# Patient Record
Sex: FEMALE | Race: OTHER | Employment: FULL TIME | URBAN - METROPOLITAN AREA
[De-identification: names, ages, dates, MRNs, and addresses within clinical notes are randomized per-mention and may not be internally consistent; named-entity substitution may affect disease eponyms.]

---

## 2018-05-22 ENCOUNTER — OFFICE VISIT (OUTPATIENT)
Dept: FAMILY MEDICINE CLINIC | Age: 18
End: 2018-05-22

## 2018-05-22 ENCOUNTER — TELEPHONE (OUTPATIENT)
Dept: FAMILY MEDICINE CLINIC | Age: 18
End: 2018-05-22

## 2018-05-22 VITALS
WEIGHT: 183 LBS | HEIGHT: 62 IN | SYSTOLIC BLOOD PRESSURE: 119 MMHG | OXYGEN SATURATION: 97 % | DIASTOLIC BLOOD PRESSURE: 77 MMHG | RESPIRATION RATE: 16 BRPM | TEMPERATURE: 98.1 F | HEART RATE: 78 BPM | BODY MASS INDEX: 33.68 KG/M2

## 2018-05-22 DIAGNOSIS — R10.11 ABDOMINAL PAIN, RIGHT UPPER QUADRANT: ICD-10-CM

## 2018-05-22 DIAGNOSIS — G89.29 CHRONIC PAIN OF BOTH KNEES: Primary | ICD-10-CM

## 2018-05-22 DIAGNOSIS — M25.561 CHRONIC PAIN OF BOTH KNEES: Primary | ICD-10-CM

## 2018-05-22 DIAGNOSIS — J45.20 MILD INTERMITTENT ASTHMA WITHOUT COMPLICATION: ICD-10-CM

## 2018-05-22 DIAGNOSIS — M25.562 CHRONIC PAIN OF BOTH KNEES: Primary | ICD-10-CM

## 2018-05-22 DIAGNOSIS — Z76.89 ENCOUNTER TO ESTABLISH CARE: ICD-10-CM

## 2018-05-22 DIAGNOSIS — N94.6 DYSMENORRHEA: ICD-10-CM

## 2018-05-22 RX ORDER — ALBUTEROL SULFATE 90 UG/1
2 AEROSOL, METERED RESPIRATORY (INHALATION)
Qty: 1 INHALER | Refills: 10 | Status: SHIPPED | OUTPATIENT
Start: 2018-05-22

## 2018-05-22 RX ORDER — ALBUTEROL SULFATE 90 UG/1
2 AEROSOL, METERED RESPIRATORY (INHALATION)
Qty: 1 INHALER | Refills: 10 | Status: SHIPPED | OUTPATIENT
Start: 2018-05-22 | End: 2018-05-22

## 2018-05-22 NOTE — TELEPHONE ENCOUNTER
/Refill  Received:  Today       [facility] Front Office                     [facility] [place] [facility] is requesting an authorization from [place] for Rx  Proventil, as the pt insurance company is declining due to a resident physician prescribing this Rx.  P [phone]

## 2018-05-22 NOTE — PROGRESS NOTES
Cody Waddell is a 19 y.o. female who presents to establish care. Pt moved to [place] about a month ago from [place]. She is currently training to be a [employer]. Pt would like to discuss the following;    1) Chronic Bilateral Knee pain: Patient requests referral to Ortho for bilateral knee pain. Reports history of right knee popping about 6 years ago while dancing and PCP said she torn her ligament. This was symptomatically managed. About 3-4 years ago, pt fell while skate boarding and states that her left knee \"popped\" but she did not seek care. Pt has since been having occasional swelling of both knees. Medication tried at home include Tylenol. She would also use her dad's knee brace as needed. Patient reinjured both knees on several occasions as she continued to [employer]. 2) Abdominal Pain: Reports progressive RUQ pain that wax and wanes. The pain began about a year ago. Pt describes it as dull and aching which is usually associated with \"eating too much. \"  Sometimes radiate to her back. The patient denies fever, nausea and vomiting. Patient denies heavy NSAIDs use. Pt has not tried anything for the pain. She drinks alcohol socially. Smokes about 2-4 sticks of cigarettes per day for about 11 years (started at age 3). 3) Dysmenorrhea: Pt has history of dysmenorrhea for which she was initially on OCP but would like to discuss different options as she sometimes forget to take her OCP. She states that she a one year old daughter and would like to focus on her career for now. LMP: Pt is currently on her period. Periods are Irregular with dysmenorrhea. Normal menstrual flow. 4) Asthma: Mild intermittent asthma. Well controlled. Usually triggered by exercised. Pt would like to be prescribed albuterol inhaler.      Allergies - reviewed:   No Known Allergies      Medications - reviewed:   Current Outpatient Prescriptions   Medication Sig    albuterol (PROVENTIL HFA, VENTOLIN HFA, PROAIR HFA) 90 mcg/actuation inhaler Take 2 Puffs by inhalation every four (4) hours as needed for Wheezing. No current facility-administered medications for this visit. Past Medical History - reviewed:  Past Medical History:   Diagnosis Date    Asthma        Past Surgical History - reviewed:   History reviewed. No pertinent surgical history. Social History - reviewed:  Social History     Social History    Marital status: SINGLE     Spouse name: N/A    Number of children: N/A    Years of education: N/A     Occupational History    Not on file. Social History Main Topics    Smoking status: Current Every Day Smoker    Smokeless tobacco: Never Used    Alcohol use No    Drug use: No    Sexual activity: Yes     Other Topics Concern    Not on file     Social History Narrative    No narrative on file         Family History - reviewed:  Family History   Problem Relation Age of Onset    Diabetes Maternal Grandmother          Immunizations - reviewed: There is no immunization history on file for this patient. Review of Systems:  A comprehensive review of systems was negative except for that written in the History of Present Illness.       Physical Exam  Visit Vitals    /77    Pulse 78    Temp 98.1 °F (36.7 °C) (Oral)    Resp 16    Ht 5' 2.21\" (1.58 m)    Wt 183 lb (83 kg)    LMP 05/20/2018    SpO2 97%    BMI 33.25 kg/m2       General appearance - alert, well appearing, and in no distress and oriented to person, place, and time  Eyes - pupils equal and reactive, extraocular eye movements intact  Chest - clear to auscultation, no wheezes, rales or rhonchi, symmetric air entry  Heart - normal rate, regular rhythm, normal S1, S2, no murmurs, rubs, clicks or gallops  Abdomen - soft, nontender, nondistended, no masses or organomegaly  Neurological - alert, oriented, normal speech, no focal findings or movement disorder noted    Musculoskeletal:  Knee: bilaterally  Knee Effusion: None  Quadriceps atrophy: None   Popliteal (Bakers) Cyst: Negative   Patellofemoral crepitus: Positive in the left knee    ROM:  Flexion: 130  Extension: 0   Hip IR/ER: FROM without pain    Dynamic Test:  Gait: Normal   Assistive devices: None    Palpation:   Patella tenderness: None  Medial joint line tenderness: Present  Lateral joint line tenderness: Present on left knee  MCL tenderness: Present  LCL tenderness: None    Ligament/Meniscal Exam:  Patellar Grind: Crepitus in both knees (L >R)  Lochman: Negative, with good endpoint   Anterior Drawer: Mild laxity   Posterior Drawer: Negative     Strength (0-5/5):   Flexion: Left: 5/5    Right: 5/5    Extension: Left: 5/5    Right: 5/5      Extremities - peripheral pulses normal, no pedal edema, no clubbing or cyanosis  Skin - normal coloration and turgor, no rashes, no suspicious skin lesions noted      Assessment/Plan    ICD-10-CM ICD-9-CM    1. Encounter to establish care Z76.89 V65.8    2. Chronic pain of both knees M25.561 719.46 REFERRAL TO PHYSICAL THERAPY    M25.562 338.29 XR KNEES BI STAND    G89.29  CANCELED: XR KNEE LT 3 V   3. Abdominal pain, right upper quadrant C09.86 021.96 METABOLIC PANEL, COMPREHENSIVE      US ABD COMP   4. Dysmenorrhea N94.6 625.3    5. Mild intermittent asthma without complication Z38.01 027.78 DISCONTINUED: albuterol (PROVENTIL HFA, VENTOLIN HFA, PROAIR HFA) 90 mcg/actuation inhaler      18 yo presenting to establish care. Patient had several concerns at this visit. 1) Chronic Bilateral Knee pain: Likely ACL and meniscus injury based on history. This is chronic.  -Bilateral knee xray  -Home Exercise Program as per handout.  -Ice 15 minutes, three times a day PRN and after exercise. Can alternate with heat for 15 minutes. -Naproxin or Acetaminophen (Tylenol) as needed for pain. -Referral to physical therapy    2) Abdominal Pain: RUQ pain, worst with food, wax and wanes.  Differential includes Cholelithiasis although it could be secondary to liver infection  -RUQ ultrasound  -CMP    3) Dysmenorrhea: Currently on her period. Tried OCP in the past but states that she would forget to get them on several days. Would like an Nexplanon.  -Directed to the health center as we do not administer it in clinic. 4) Asthma: Mild intermittent asthma. Well controlled.   -Albuterol prn    5) Allergies: Continue Flonase and Claritin     Patient will be contacted once above lab results and images are available. Follow-up Disposition:  Return if symptoms worsen or fail to improve. I have discussed the diagnosis with the patient and the intended plan as seen in the above orders. Patient verbalized understanding of the plan and agrees with the plan. The patient has received an after-visit summary and questions were answered concerning future plans. I have discussed medication side effects and warnings with the patient as well. Informed patient to return to the office if new symptoms arise.     Patient discussed with Dr. Yifan Corey (supervising physician)    Vel Kelly MD  Family Medicine Resident

## 2018-05-22 NOTE — PROGRESS NOTES
Chief Complaint   Patient presents with   Daren Cole Referral Request     Orthopedic   patient is requesting referral due to having pain in both right and left knee     1. Have you been to the ER, urgent care clinic since your last visit? Hospitalized since your last visit? No    2. Have you seen or consulted any other health care providers outside of the [facility] since your last visit? Include any pap smears or colon screening.  No

## 2018-05-22 NOTE — PATIENT INSTRUCTIONS
1. Home Exercise Program as per handout. 2. Ice 15 minutes, three times a day PRN and after exercise. Can alternate with heat for 15 minutes. Medications:    1. Naproxin (Aleve): 220mg 1-2 tablets twice a day PRN. 2. Acetaminophen (Tylenol):  500mg 1-2 tablets every 6 hours as needed for pain.

## 2018-05-22 NOTE — MR AVS SNAPSHOT
[address] [facility] 
[phone] Patient: Darion Avila MRN: [id number] Visit Information Date & Time Provider Department Dept. Phone Encounter #  
 2018  9:30 AM Vita Wright MD [facility] [facility] [phone] [id number] Follow-up Instructions Return if symptoms worsen or fail to improve. Upcoming Health Maintenance Date Due Hepatitis A Peds Age 1-18 (1 of 2 - Standard Series) 1999 DTaP/Tdap/Td series (1 - Tdap) 2005 HPV Age 9Y-34Y (1 of 3 - Female 3 Dose Series) 2009 Influenza Age 5 to Adult 2018 Allergies as of 2018  Review Complete On: 2018 By: Atul Stewart LPN No Known Allergies Current Immunizations  Never Reviewed No immunizations on file. Not reviewed this visit You Were Diagnosed With   
  
 Codes Comments Encounter to establish care    -  Primary ICD-10-CM: Z76.89 
ICD-9-CM: V65.8 Chronic pain of both knees     ICD-10-CM: M25.561, M25.562, G89.29 ICD-9-CM: 719.46, 338.29 Dysmenorrhea     ICD-10-CM: N94.6 ICD-9-CM: 625.3 Mild intermittent asthma without complication     NIP-84-MW: J45.20 ICD-9-CM: 493.90 Abdominal pain, right upper quadrant     ICD-10-CM: R10.11 ICD-9-CM: 789.01 Vitals BP Pulse Temp Resp Height(growth percentile) Weight(growth percentile) 119/77 78 98.1 °F (36.7 °C) (Oral) 16 5' 2.21\" (1.58 m) 183 lb (83 kg) LMP SpO2 BMI Smoking Status 2018 97% 33.25 kg/m2 Current Every Day Smoker Vitals History BMI and BSA Data Body Mass Index Body Surface Area  
 33.25 kg/m 2 1.91 m 2 Preferred Pharmacy Pharmacy Name Phone [facility] [address] AT [address] [place] [address] [phone] Your Updated Medication List  
  
   
 This list is accurate as of 5/22/18 10:41 AM.  Always use your most recent med list.  
  
  
  
  
 albuterol 90 mcg/actuation inhaler Commonly known as:  PROVENTIL HFA, VENTOLIN HFA, PROAIR HFA Take 2 Puffs by inhalation every four (4) hours as needed for Wheezing. Prescriptions Sent to Pharmacy Refills  
 albuterol (PROVENTIL HFA, VENTOLIN HFA, PROAIR HFA) 90 mcg/actuation inhaler 10 Sig: Take 2 Puffs by inhalation every four (4) hours as needed for Wheezing. Class: Normal  
 Pharmacy: [facility] [address] [place] [address] [id number] AT [facility] [address] ([facility] Ph #: [phone] Route: Inhalation We Performed the Following METABOLIC PANEL, COMPREHENSIVE [89085 CPT(R)] REFERRAL TO PHYSICAL THERAPY [MVD13 Custom] Comments:  
 Please evaluate patient for bilateral knee pain and treat. Please schedule and authorize patient for services : As permitted by patient's schedule Follow-up Instructions Return if symptoms worsen or fail to improve. To-Do List   
 05/22/2018 Imaging:  US ABD COMP   
  
 05/22/2018 Imaging:  XR KNEE LT 3 V Referral Information Referral ID Referred By Referred To  
  
 [id number] Mauro Mary Not Available Visits Status Start Date End Date 1 New Request 5/22/18 5/22/19 If your referral has a status of pending review or denied, additional information will be sent to support the outcome of this decision. Patient Instructions 1. Home Exercise Program as per handout. 2. Ice 15 minutes, three times a day PRN and after exercise. Can alternate with heat for 15 minutes. Medications:  
 1. Naproxin (Aleve): 220mg 1-2 tablets twice a day PRN. 2. Acetaminophen (Tylenol):  500mg 1-2 tablets every 6 hours as needed for pain. Introducing [facility]!    
 [facility] introduces [employer] patient portal. Now you can access parts of your medical record, email your doctor's office, and request medication refills online. 1. In your internet browser, go to [email or web address] 2. Click on the First Time User? Click Here link in the Sign In box. You will see the New Member Sign Up page. 3. Enter your [employer] Access Code exactly as it appears below. You will not need to use this code after youve completed the sign-up process. If you do not sign up before the expiration date, you must request a new code. · [employer] Access Code: [facility] Expires: 8/20/2018 10:38 AM 
 
4. Enter the last four digits of your Social Security Number (xxxx) and Date of Birth (mm/dd/yyyy) as indicated and click Submit. You will be taken to the next sign-up page. 5. Create a [employer] ID. This will be your [employer] login ID and cannot be changed, so think of one that is secure and easy to remember. 6. Create a [employer] password. You can change your password at any time. 7. Enter your Password Reset Question and Answer. This can be used at a later time if you forget your password. 8. Enter your e-mail address. You will receive e-mail notification when new information is available in [address] 9. Click Sign Up. You can now view and download portions of your medical record. 10. Click the Download Summary menu link to download a portable copy of your medical information. If you have questions, please visit the Frequently Asked Questions section of the [employer] website. Remember, [employer] is NOT to be used for urgent needs. For medical emergencies, dial 911. Now available from your iPhone and Android! Please provide this summary of care documentation to your next provider. If you have any questions after today's visit, please call [phone].

## 2018-05-23 LAB
ALBUMIN SERPL-MCNC: 4.3 G/DL (ref 3.5–5.5)
ALBUMIN/GLOB SERPL: 1.4 {RATIO} (ref 1.2–2.2)
ALP SERPL-CCNC: 78 IU/L (ref 39–117)
ALT SERPL-CCNC: 7 IU/L (ref 0–32)
AST SERPL-CCNC: 13 IU/L (ref 0–40)
BILIRUB SERPL-MCNC: <0.2 MG/DL (ref 0–1.2)
BUN SERPL-MCNC: 10 MG/DL (ref 6–20)
BUN/CREAT SERPL: 14 (ref 9–23)
CALCIUM SERPL-MCNC: 9.1 MG/DL (ref 8.7–10.2)
CHLORIDE SERPL-SCNC: 103 MMOL/L (ref 96–106)
CO2 SERPL-SCNC: 23 MMOL/L (ref 18–29)
CREAT SERPL-MCNC: 0.72 MG/DL (ref 0.57–1)
GFR SERPLBLD CREATININE-BSD FMLA CKD-EPI: 121 ML/MIN/1.73
GFR SERPLBLD CREATININE-BSD FMLA CKD-EPI: 139 ML/MIN/1.73
GLOBULIN SER CALC-MCNC: 3 G/DL (ref 1.5–4.5)
GLUCOSE SERPL-MCNC: 83 MG/DL (ref 65–99)
POTASSIUM SERPL-SCNC: 4.7 MMOL/L (ref 3.5–5.2)
PROT SERPL-MCNC: 7.3 G/DL (ref 6–8.5)
SODIUM SERPL-SCNC: 140 MMOL/L (ref 134–144)

## 2018-05-31 ENCOUNTER — HOSPITAL ENCOUNTER (OUTPATIENT)
Dept: ULTRASOUND IMAGING | Age: 18
Discharge: HOME | End: 2018-05-31
Attending: STUDENT IN AN ORGANIZED HEALTH CARE EDUCATION/TRAINING PROGRAM
Payer: MEDICAID

## 2018-05-31 DIAGNOSIS — R10.11 ABDOMINAL PAIN, RIGHT UPPER QUADRANT: ICD-10-CM

## 2018-05-31 PROCEDURE — 76700 US EXAM ABDOM COMPLETE: CPT

## 2018-06-09 ENCOUNTER — OFFICE VISIT (OUTPATIENT)
Dept: FAMILY MEDICINE CLINIC | Age: 18
End: 2018-06-09

## 2018-06-09 VITALS
BODY MASS INDEX: 33.2 KG/M2 | DIASTOLIC BLOOD PRESSURE: 76 MMHG | TEMPERATURE: 98.6 F | HEIGHT: 62 IN | SYSTOLIC BLOOD PRESSURE: 113 MMHG | OXYGEN SATURATION: 98 % | WEIGHT: 180.4 LBS | HEART RATE: 71 BPM

## 2018-06-09 DIAGNOSIS — J06.9 UPPER RESPIRATORY TRACT INFECTION, UNSPECIFIED TYPE: Primary | ICD-10-CM

## 2018-06-09 DIAGNOSIS — L29.9 EAR ITCH: ICD-10-CM

## 2018-06-09 DIAGNOSIS — F17.200 SMOKER: ICD-10-CM

## 2018-06-09 RX ORDER — AZITHROMYCIN 250 MG/1
TABLET, FILM COATED ORAL
Qty: 6 TAB | Refills: 0 | Status: SHIPPED | OUTPATIENT
Start: 2018-06-09 | End: 2018-06-14

## 2018-06-09 RX ORDER — HYDROCORTISONE AND ACETIC ACID 20.75; 10.375 MG/ML; MG/ML
2 SOLUTION AURICULAR (OTIC) 2 TIMES DAILY
Qty: 1 BOTTLE | Refills: 1 | Status: SHIPPED | OUTPATIENT
Start: 2018-06-09 | End: 2018-06-14

## 2018-06-09 NOTE — LETTER
6/9/2018 9:55 AM 
 
Ms. Paiz [address] [place] [address] Body mass index is 32.78 kg/(m^2). Focus on regular exercise (150 minutes each week) and healthy eating. Eat more fruits and vegetables. Eat more protein (egg whites, beans, and nuts you know you tolerate) and less carbohydrates (white bread, white rice, white pasta, white potatoes, sodas, and sweets). Eat appropriately small portion sizes. I strongly suggest that you avoid use of any tobacco products. This may be the most important thing you can do for your health. While medicines may help, the most important thing for you is the decision to quit. If you need a \"Quit \", please call 9-761-QUIT NOW (1-728.915.8363). If you need help with nicotine withdrawal, I suggest over-the-counter nicotine replacement aids such as nicotine gum or patches, used as directed. As you may know, use of tobacco products increases your risk for many forms of cancer, heart disease, stroke, and blood clotting. Your body is very forgiving. Quit now and improve your health! Use Vosol ear drops for itching. Sincerely, Tammy Michelle MD

## 2018-06-09 NOTE — PROGRESS NOTES
Chief Complaint   Patient presents with    Ear Pain     pt states history of ear infection, states currently a  taking med for swimmers ear with no improvement     1. Have you been to the ER, urgent care clinic since your last visit? Hospitalized since your last visit? No    2. Have you seen or consulted any other health care providers outside of the [facility] since your last visit? Include any pap smears or colon screening.  No'

## 2018-06-09 NOTE — PROGRESS NOTES
Luis A Rebollar is a 19 y.o. female      Issues discussed today include:        Signs and symptoms:  Ear pains  Duration:  5 days  Context:  She works as  at [employer]:  Both ears  Quality:  aches  Severity:  annoying  Timing:  Wax wane  Modifying factors:  Rx zmax and vosol HC    Data reviewed or ordered today:  See letter    Other problems include: There is no problem list on file for this patient. Medications:  Current Outpatient Prescriptions   Medication Sig Dispense Refill    carbamide peroxide (EAR DROPS OTC OT) by Otic route.  hydrocortisone-acetic acid (VOSOL-HC) otic solution Administer 2 Drops into each ear two (2) times a day for 5 days. 1 Bottle 1    azithromycin (ZITHROMAX) 250 mg tablet Take 2 tablets today, then take 1 tablet daily 6 Tab 0    albuterol (PROVENTIL HFA, VENTOLIN HFA, PROAIR HFA) 90 mcg/actuation inhaler Take 2 Puffs by inhalation every four (4) hours as needed for Wheezing. 1 Inhaler 10       Allergies:  No Known Allergies    LMP:  Patient's last menstrual period was 05/20/2018. Social History     Social History    Marital status: SINGLE     Spouse name: N/A    Number of children: N/A    Years of education: N/A     Occupational History    Not on file. Social History Main Topics    Smoking status: Current Every Day Smoker    Smokeless tobacco: Never Used    Alcohol use No    Drug use: No    Sexual activity: Yes     Other Topics Concern    Not on file     Social History Narrative         Family History   Problem Relation Age of Onset    Diabetes Maternal Grandmother          Meaningful use:  done      ROS:  Headaches:  no  Chest Pain:  no  SOB:  no  Fevers:  no  Falls:  no  anxiety/depression/losing interest in doing things that were previously enjoyed:  no. PHQ2 = 1  Other significant ROS:  +smoker    Patient's last menstrual period was 05/20/2018.     Physical Exam  Visit Vitals    /76 (BP 1 Location: Left arm, BP Patient Position: Sitting)    Pulse 71    Temp 98.6 °F (37 °C) (Oral)    Ht 5' 2.21\" (1.58 m)    Wt 180 lb 6.4 oz (81.8 kg)    LMP 05/20/2018    SpO2 98%    BMI 32.78 kg/m2     BP Readings from Last 3 Encounters:   06/09/18 113/76   05/22/18 119/77     Constitutional:  Appears well,  No Acute Distress, Vitals noted  Psychiatric:   Affect normal, Alert and cooperative, Oriented to person/place/time    Eyes:   Pupils equally round and reactive, EOMI, conjunctiva clear, eyelids normal  ENT:   External ears and nose normal/lips, teeth=OK/gums normal, TMs and Orophyarynx normal, ?otitis externa  Neck:   general inspection and Thyroid normal.  No abnormal cervical or supraclavicular nodes    Lungs:   clear to auscultation, good respiratory effort  Heart: Ausculation normal.  Regular rhythm. No cardiac murmurs. No carotid bruits or palpable thrills  Chest wall normal  Abdominal exam:   normal.  Liver and spleen normal.  No bruits/masses/tenderness    Extremities:   without edema, good peripheral pulses  Skin:   Warm to palpation, without rashes, bruising, or suspicious lesions           Assessment:    There is no problem list on file for this patient. Today's diagnoses are:    ICD-10-CM ICD-9-CM    1. Upper respiratory tract infection, unspecified type J06.9 465.9 azithromycin (ZITHROMAX) 250 mg tablet   2. Smoker F17.200 305.1     see letter   3. BMI 32.0-32.9,adult Z68.32 V85.32     see letter   4. Ear itch L29.9 698.9 hydrocortisone-acetic acid (VOSOL-HC) otic solution       Plan:  Orders Placed This Encounter    carbamide peroxide (EAR DROPS OTC OT)     Sig: by Otic route.  hydrocortisone-acetic acid (VOSOL-HC) otic solution     Sig: Administer 2 Drops into each ear two (2) times a day for 5 days.      Dispense:  1 Bottle     Refill:  1    azithromycin (ZITHROMAX) 250 mg tablet     Sig: Take 2 tablets today, then take 1 tablet daily     Dispense:  6 Tab     Refill:  0       See patient instructions  Patient Instructions   Take zithromax with food    See letter        refresh note:  done    AVS Printed:  done        Diagnoses and all orders for this visit:    1. Upper respiratory tract infection, unspecified type  -     azithromycin (ZITHROMAX) 250 mg tablet; Take 2 tablets today, then take 1 tablet daily    2. Smoker  Comments:  see letter    3. BMI 32.0-32.9,adult  Comments:  see letter    4. Ear itch  -     hydrocortisone-acetic acid (VOSOL-HC) otic solution; Administer 2 Drops into each ear two (2) times a day for 5 days.

## 2018-06-09 NOTE — MR AVS SNAPSHOT
[address] 
[phone] Patient: Haydee Zhang MRN: [id number] Visit Information Date & Time Provider Department Dept. Phone Encounter #  
 6/9/2018 10:50 AM Ulisses Conteh MD [address] [place] [address] [phone] [id number] Your Appointments 6/9/2018 10:50 AM  
WALK IN with Ulisses Conteh MD  
[address] [place], [place] [address]afshan Mayaas) Appt Note: ear infection [address] [facility],[place] 3 70 [facility]  
[phone]  
  
   
 [address] [place] [address] [phone] Upcoming Health Maintenance Date Due Hepatitis A Peds Age 1-18 (1 of 2 - Standard Series) 4/23/1999 DTaP/Tdap/Td series (1 - Tdap) 4/23/2005 HPV Age 9Y-34Y (1 of 3 - Female 3 Dose Series) 4/23/2009 Pneumococcal 19-64 Medium Risk (1 of 1 - PPSV23) 4/23/2017 Influenza Age 5 to Adult 8/1/2018 Allergies as of 6/9/2018  Review Complete On: 6/9/2018 By: Zackary Castellon LPN No Known Allergies Current Immunizations  Never Reviewed No immunizations on file. Not reviewed this visit You Were Diagnosed With   
  
 Codes Comments Upper respiratory tract infection, unspecified type    -  Primary ICD-10-CM: J06.9 ICD-9-CM: 465.9 Smoker     ICD-10-CM: L03.756 ICD-9-CM: 305.1 see letter BMI 32.0-32.9,adult     ICD-10-CM: D23.45 
ICD-9-CM: V85.32 see letter Ear itch     ICD-10-CM: L29.9 ICD-9-CM: 698.9 Vitals BP Pulse Temp Height(growth percentile) Weight(growth percentile) LMP  
 113/76 (BP 1 Location: Left arm, BP Patient Position: Sitting) 71 98.6 °F (37 °C) (Oral) 5' 2.21\" (1.58 m) 180 lb 6.4 oz (81.8 kg) 05/20/2018 SpO2 BMI Smoking Status 98% 32.78 kg/m2 Current Every Day Smoker Vitals History BMI and BSA Data Body Mass Index Body Surface Area 32.78 kg/m 2 1.89 m 2 Preferred Pharmacy Pharmacy Name Phone [address], [address] 1700 [facility] [address] [phone] Your Updated Medication List  
  
   
This list is accurate as of 6/9/18  9:58 AM.  Always use your most recent med list.  
  
  
  
  
 albuterol 90 mcg/actuation inhaler Commonly known as:  PROVENTIL HFA, VENTOLIN HFA, PROAIR HFA Take 2 Puffs by inhalation every four (4) hours as needed for Wheezing. azithromycin 250 mg tablet Commonly known as:  Odette Devantem Take 2 tablets today, then take 1 tablet daily EAR DROPS OTC OT  
by Otic route. hydrocortisone-acetic acid otic solution Commonly known as:  VOSOL-HC Administer 2 Drops into each ear two (2) times a day for 5 days. Prescriptions Printed Refills  
 hydrocortisone-acetic acid (VOSOL-HC) otic solution 1 Sig: Administer 2 Drops into each ear two (2) times a day for 5 days. Class: Print Route: Both Ears Prescriptions Sent to Pharmacy Refills  
 azithromycin (ZITHROMAX) 250 mg tablet 0 Sig: Take 2 tablets today, then take 1 tablet daily Class: Normal  
 Pharmacy: [address], [address] 1700 [facility] #: [phone] Patient Instructions Take zithromax with food See letter Introducing [facility]! Sharri Amezquita introduces [employer] patient portal. Now you can access parts of your medical record, email your doctor's office, and request medication refills online. 1. In your internet browser, go to [email or web address] 2. Click on the First Time User? Click Here link in the Sign In box. You will see the New Member Sign Up page. 3. Enter your [employer] Access Code exactly as it appears below. You will not need to use this code after youve completed the sign-up process. If you do not sign up before the expiration date, you must request a new code.  
 
· [employer] Access Code: [email or web address] 
 Expires: 8/22/2018 10:36 AM 
 
4. Enter the last four digits of your Social Security Number (xxxx) and Date of Birth (mm/dd/yyyy) as indicated and click Submit. You will be taken to the next sign-up page. 5. Create a Brazzlebox ID. This will be your Brazzlebox login ID and cannot be changed, so think of one that is secure and easy to remember. 6. Create a Brazzlebox password. You can change your password at any time. 7. Enter your Password Reset Question and Answer. This can be used at a later time if you forget your password. 8. Enter your e-mail address. You will receive e-mail notification when new information is available in [address] 9. Click Sign Up. You can now view and download portions of your medical record. 10. Click the Download Summary menu link to download a portable copy of your medical information. If you have questions, please visit the Frequently Asked Questions section of the Brazzlebox website. Remember, [employer]box is NOT to be used for urgent needs. For medical emergencies, dial 911. Now available from your iPhone and Android! Please provide this summary of care documentation to your next provider. Your primary care clinician is listed as Maryanne Wheeler. If you have any questions after today's visit, please call [phone].

## 2018-06-09 NOTE — LETTER
6/9/2018 10:02 AM 
 
Ms. [id number] [place] [phone] Consider WELLNESS exam and vaccine updates soon. Sincerely, Darryl Monroe MD

## 2018-07-07 ENCOUNTER — OFFICE VISIT (OUTPATIENT)
Dept: FAMILY MEDICINE CLINIC | Age: 18
End: 2018-07-07

## 2018-07-07 VITALS
SYSTOLIC BLOOD PRESSURE: 107 MMHG | WEIGHT: 179 LBS | OXYGEN SATURATION: 100 % | RESPIRATION RATE: 18 BRPM | HEART RATE: 78 BPM | BODY MASS INDEX: 32.52 KG/M2 | DIASTOLIC BLOOD PRESSURE: 73 MMHG | TEMPERATURE: 97.7 F

## 2018-07-07 DIAGNOSIS — F17.200 SMOKER: ICD-10-CM

## 2018-07-07 DIAGNOSIS — M76.51 PATELLAR TENDONITIS OF BOTH KNEES: Primary | ICD-10-CM

## 2018-07-07 DIAGNOSIS — M25.562 CHRONIC PAIN OF BOTH KNEES: ICD-10-CM

## 2018-07-07 DIAGNOSIS — G89.29 CHRONIC PAIN OF BOTH KNEES: ICD-10-CM

## 2018-07-07 DIAGNOSIS — M76.52 PATELLAR TENDONITIS OF BOTH KNEES: Primary | ICD-10-CM

## 2018-07-07 DIAGNOSIS — M25.561 CHRONIC PAIN OF BOTH KNEES: ICD-10-CM

## 2018-07-07 RX ORDER — MELOXICAM 7.5 MG/1
7.5 TABLET ORAL DAILY
Qty: 10 TAB | Refills: 0 | Status: SHIPPED | OUTPATIENT
Start: 2018-07-07 | End: 2018-07-22 | Stop reason: SDUPTHER

## 2018-07-07 RX ORDER — LEVONORGESTREL AND ETHINYL ESTRADIOL 0.1-0.02MG
KIT ORAL
COMMUNITY
Start: 2018-06-18

## 2018-07-07 NOTE — PATIENT INSTRUCTIONS
Continue physical therapy    Take meloxicam with food    Stay active    See Dr. Kari Ackerman #[phone]

## 2018-07-07 NOTE — MR AVS SNAPSHOT
[address] 
[phone] Patient: Estephania Sifuentes MRN: [id number] Visit Information Date & Time Provider Department Dept. Phone Encounter #  
 7/7/2018  8:00 AM Melody Gillis MD [facility] [phone] [id number] Upcoming Health Maintenance Date Due Hepatitis A Peds Age 1-18 (1 of 2 - Standard Series) 4/23/1999 DTaP/Tdap/Td series (1 - Tdap) 4/23/2005 HPV Age 9Y-34Y (1 of 3 - Female 3 Dose Series) 4/23/2009 Pneumococcal 19-64 Medium Risk (1 of 1 - PPSV23) 4/23/2017 Influenza Age 5 to Adult 8/1/2018 Allergies as of 7/7/2018  Review Complete On: 7/7/2018 By: Melody Gillis MD  
 No Known Allergies Current Immunizations  Never Reviewed No immunizations on file. Not reviewed this visit You Were Diagnosed With   
  
 Codes Comments Patellar tendonitis of both knees    -  Primary ICD-10-CM: M76.51, M76.52 
ICD-9-CM: 726.64 Chronic pain of both knees     ICD-10-CM: M25.561, M25.562, G89.29 ICD-9-CM: 719.46, 338.29 Vitals BP Pulse Temp Resp Weight(growth percentile) LMP  
 107/73 (BP 1 Location: Right arm, BP Patient Position: Sitting) 78 97.7 °F (36.5 °C) (Oral) 18 179 lb (81.2 kg) 06/20/2018 SpO2 BMI Smoking Status 100% 32.52 kg/m2 Current Every Day Smoker BMI and BSA Data Body Mass Index Body Surface Area 32.52 kg/m 2 1.89 m 2 Preferred Pharmacy Pharmacy Name Phone [address], [address] [place] [address] [address]. [address] [facility] [phone] Your Updated Medication List  
  
   
This list is accurate as of 7/7/18  8:25 AM.  Always use your most recent med list.  
  
  
  
  
 albuterol 90 mcg/actuation inhaler Commonly known as:  PROVENTIL HFA, VENTOLIN HFA, PROAIR HFA Take 2 Puffs by inhalation every four (4) hours as needed for Wheezing. AVIANE 0.1-20 mg-mcg Tab Generic drug:  levonorgestrel-ethinyl estradiol EAR DROPS OTC OT  
by Otic route. We Performed the Following REFERRAL TO ORTHOPEDIC SURGERY [REF62 Custom] Comments:  
 Dr. Roseann Max #[phone] Referral Information Referral ID Referred By Referred To  
  
 [id number] Moisés Hawk Not Available Visits Status Start Date End Date 1 New Request 7/7/18 7/7/19 If your referral has a status of pending review or denied, additional information will be sent to support the outcome of this decision. Patient Instructions Continue physical therapy Take meloxicam with food Stay active See Dr. Roseann Max #[phone] Introducing [facility] & [facility]! Caryn Otero introduces [employer] patient portal. Now you can access parts of your medical record, email your doctor's office, and request medication refills online. 1. In your internet browser, go to [email or web address] 2. Click on the First Time User? Click Here link in the Sign In box. You will see the New Member Sign Up page. 3. Enter your [employer] Access Code exactly as it appears below. You will not need to use this code after youve completed the sign-up process. If you do not sign up before the expiration date, you must request a new code. · [employer] Access Code: [email or web address] Expires: 8/22/2018 10:36 AM 
 
4. Enter the last four digits of your Social Security Number (xxxx) and Date of Birth (mm/dd/yyyy) as indicated and click Submit. You will be taken to the next sign-up page. 5. Create a Zillowt ID. This will be your [employer] login ID and cannot be changed, so think of one that is secure and easy to remember. 6. Create a [employer] password. You can change your password at any time. 7. Enter your Password Reset Question and Answer. This can be used at a later time if you forget your password. 8. Enter your e-mail address.  You will receive e-mail notification when new information is available in [employer]. 9. Click Sign Up. You can now view and download portions of your medical record. 10. Click the Download Summary menu link to download a portable copy of your medical information. If you have questions, please visit the Frequently Asked Questions section of the [employer] website. Remember, [employer] is NOT to be used for urgent needs. For medical emergencies, dial 911. Now available from your iPhone and Android! Please provide this summary of care documentation to your next provider. Your primary care clinician is listed as Davin Garcia. If you have any questions after today's visit, please call [phone].

## 2018-07-07 NOTE — PROGRESS NOTES
Miko Jay is a 19 y.o. female      Issues discussed today include:        Signs and symptoms:  Bilateral knee pain L>R  Duration:  weeks  Context:  She is getting PT but not helping  Location:  Left patellar area  Quality:  Sounds like patellar tendonitis  Severity:  Impacting activity  Timing:  She had normal XR both knees 5/2018  Modifying factors:  Rx mobic and refer ortho    Data reviewed or ordered today:  XR 5/2018    Other problems include:  Patient Active Problem List   Diagnosis Code    Smoker F17.200    Patellar tendonitis of both knees M76.51, M76.52       Medications:  Current Outpatient Prescriptions   Medication Sig Dispense Refill    meloxicam (MOBIC) 7.5 mg tablet Take 1 Tab by mouth daily. 10 Tab 0    AVIANE 0.1-20 mg-mcg tab       carbamide peroxide (EAR DROPS OTC OT) by Otic route.  albuterol (PROVENTIL HFA, VENTOLIN HFA, PROAIR HFA) 90 mcg/actuation inhaler Take 2 Puffs by inhalation every four (4) hours as needed for Wheezing. 1 Inhaler 10       Allergies:  No Known Allergies    LMP:  Patient's last menstrual period was 06/20/2018. Social History     Social History    Marital status: SINGLE     Spouse name: N/A    Number of children: N/A    Years of education: N/A     Occupational History    Not on file. Social History Main Topics    Smoking status: Current Every Day Smoker    Smokeless tobacco: Never Used    Alcohol use No    Drug use: No    Sexual activity: Yes     Other Topics Concern    Not on file     Social History Narrative         Family History   Problem Relation Age of Onset    Diabetes Maternal Grandmother          Meaningful use:  done      ROS:  Headaches:  no  Chest Pain:  no  SOB:  no  Fevers:  no  Falls:  no  Other significant ROS:      Patient's last menstrual period was 06/20/2018.     Physical Exam  Visit Vitals    /73 (BP 1 Location: Right arm, BP Patient Position: Sitting)    Pulse 78    Temp 97.7 °F (36.5 °C) (Oral)    Resp 18    Wt 179 lb (81.2 kg)    LMP 06/20/2018    SpO2 100%    BMI 32.52 kg/m2     BP Readings from Last 3 Encounters:   07/07/18 107/73   06/09/18 113/76   05/22/18 119/77     Constitutional:  Appears well,  No Acute Distress, Vitals noted  Psychiatric:   Affect normal, Alert and cooperative, Oriented to person/place/time    MSK:  Full ROM both knees. No joint laxity. Patella and ankle reflexes normal.  CS/LS seems ok. Some tenderness of SI joints. Assessment:    Patient Active Problem List   Diagnosis Code    Smoker F17.200    Patellar tendonitis of both knees M76.51, M76.52       Today's diagnoses are:    ICD-10-CM ICD-9-CM    1. Patellar tendonitis of both knees M76.51 726.64 REFERRAL TO ORTHOPEDIC SURGERY    M76.52  meloxicam (MOBIC) 7.5 mg tablet   2. Chronic pain of both knees M25.561 719.46 REFERRAL TO ORTHOPEDIC SURGERY    M25.562 338.29 meloxicam (MOBIC) 7.5 mg tablet    G89.29     3. Smoker F17.200 305.1     advised to avoid tobacco products 7/7/2018       Plan:  Orders Placed This Encounter    REFERRAL TO ORTHOPEDIC SURGERY     Referral Priority:   Routine     Referral Type:   Consultation     Referral Reason:   Specialty Services Required     Requested Specialty:   Orthopedic Surgery    AVIANE 0.1-20 mg-mcg tab    meloxicam (MOBIC) 7.5 mg tablet     Sig: Take 1 Tab by mouth daily.      Dispense:  10 Tab     Refill:  0     Take with food       See patient instructions  Patient Instructions   Continue physical therapy    Take meloxicam with food    Stay active    See Dr. Roseann Max #[phone]        refresh note:  done    AVS Printed:  done      advised to avoid tobacco products 7/7/2018

## 2018-07-22 DIAGNOSIS — M25.562 CHRONIC PAIN OF BOTH KNEES: ICD-10-CM

## 2018-07-22 DIAGNOSIS — M76.52 PATELLAR TENDONITIS OF BOTH KNEES: ICD-10-CM

## 2018-07-22 DIAGNOSIS — M25.561 CHRONIC PAIN OF BOTH KNEES: ICD-10-CM

## 2018-07-22 DIAGNOSIS — G89.29 CHRONIC PAIN OF BOTH KNEES: ICD-10-CM

## 2018-07-22 DIAGNOSIS — M76.51 PATELLAR TENDONITIS OF BOTH KNEES: ICD-10-CM

## 2018-07-23 RX ORDER — MELOXICAM 7.5 MG/1
TABLET ORAL
Qty: 10 TAB | Refills: 0 | Status: SHIPPED | OUTPATIENT
Start: 2018-07-23 | End: 2018-08-07 | Stop reason: SDUPTHER

## 2018-08-03 ENCOUNTER — OFFICE VISIT (OUTPATIENT)
Dept: FAMILY MEDICINE CLINIC | Age: 18
End: 2018-08-03

## 2018-08-03 VITALS
OXYGEN SATURATION: 99 % | WEIGHT: 179 LBS | TEMPERATURE: 97.2 F | HEIGHT: 62 IN | RESPIRATION RATE: 16 BRPM | HEART RATE: 87 BPM | DIASTOLIC BLOOD PRESSURE: 83 MMHG | SYSTOLIC BLOOD PRESSURE: 118 MMHG | BODY MASS INDEX: 32.94 KG/M2

## 2018-08-03 DIAGNOSIS — H93.13 TINNITUS OF BOTH EARS: Primary | ICD-10-CM

## 2018-08-03 DIAGNOSIS — F41.9 ANXIETY AND DEPRESSION: ICD-10-CM

## 2018-08-03 DIAGNOSIS — F32.A ANXIETY AND DEPRESSION: ICD-10-CM

## 2018-08-03 RX ORDER — PAROXETINE 10 MG/1
10 TABLET, FILM COATED ORAL DAILY
Qty: 30 TAB | Refills: 1 | Status: SHIPPED | OUTPATIENT
Start: 2018-08-03

## 2018-08-03 NOTE — MR AVS SNAPSHOT
[address] 
[phone] Patient: Darlene Bowen MRN: [id number] Visit Information Date & Time Provider Department Dept. Phone Encounter #  
 8/3/2018  9:45 AM Olivia Hill MD 9[address] [facility] [phone] [id number] Follow-up Instructions Return for 2 week med check . Upcoming Health Maintenance Date Due Hepatitis A Peds Age 1-18 (1 of 2 - Standard Series) 4/23/1999 DTaP/Tdap/Td series (1 - Tdap) 4/23/2005 HPV Age 9Y-34Y (1 of 3 - Female 3 Dose Series) 4/23/2009 Pneumococcal 19-64 Medium Risk (1 of 1 - PPSV23) 4/23/2017 Influenza Age 5 to Adult 8/1/2018 Allergies as of 8/3/2018  Review Complete On: 8/3/2018 By: Olivia Hill MD  
 No Known Allergies Current Immunizations  Never Reviewed No immunizations on file. Not reviewed this visit You Were Diagnosed With   
  
 Codes Comments Tinnitus of both ears    -  Primary ICD-10-CM: H93.13 
ICD-9-CM: 388.30 MEÑO (generalized anxiety disorder)     ICD-10-CM: F41.1 ICD-9-CM: 300.02 Vitals BP Pulse Temp Resp Height(growth percentile) Weight(growth percentile) 118/83 87 97.2 °F (36.2 °C) (Oral) 16 5' 2.21\" (1.58 m) 179 lb (81.2 kg) LMP SpO2 BMI Smoking Status 08/03/2018 99% 32.52 kg/m2 Current Every Day Smoker Vitals History BMI and BSA Data Body Mass Index Body Surface Area 32.52 kg/m 2 1.89 m 2 Preferred Pharmacy Pharmacy Name Phone [address], [facility] [facility] [phone] Your Updated Medication List  
  
   
This list is accurate as of 8/3/18 10:23 AM.  Always use your most recent med list.  
  
  
  
  
 albuterol 90 mcg/actuation inhaler Commonly known as:  PROVENTIL HFA, VENTOLIN HFA, PROAIR HFA Take 2 Puffs by inhalation every four (4) hours as needed for Wheezing. AVIANE 0.1-20 mg-mcg Tab Generic drug:  levonorgestrel-ethinyl estradiol EAR DROPS OTC OT  
by Otic route. meloxicam 7.5 mg tablet Commonly known as:  MOBIC  
TAKE 1 TABLET BY MOUTH ONCE DAILY PARoxetine 10 mg tablet Commonly known as:  PAXIL Take 1 Tab by mouth daily. Prescriptions Sent to Pharmacy Refills PARoxetine (PAXIL) 10 mg tablet 1 Sig: Take 1 Tab by mouth daily. Class: Normal  
 Pharmacy: [address] [facility] [address], [address] 1700 [facility] #: [phone] Route: Oral  
  
We Performed the Following REFERRAL TO ENT-OTOLARYNGOLOGY [PSA24 Custom] Follow-up Instructions Return for 2 week med check . Referral Information Referral ID Referred By Referred To  
  
 [id number] [address] [facility] [address] Visits Status Start Date End Date 1 New Request 8/3/18 8/3/19 If your referral has a status of pending review or denied, additional information will be sent to support the outcome of this decision. Patient Instructions Anxiety Disorder: Care Instructions Your Care Instructions Anxiety is a normal reaction to stress. Difficult situations can cause you to have symptoms such as sweaty palms and a nervous feeling. In an anxiety disorder, the symptoms are far more severe. Constant worry, muscle tension, trouble sleeping, nausea and diarrhea, and other symptoms can make normal daily activities difficult or impossible. These symptoms may occur for no reason, and they can affect your work, school, or social life. Medicines, counseling, and self-care can all help. Follow-up care is a key part of your treatment and safety. Be sure to make and go to all appointments, and call your doctor if you are having problems. It's also a good idea to know your test results and keep a list of the medicines you take. How can you care for yourself at home? · Take medicines exactly as directed. Call your doctor if you think you are having a problem with your medicine. · Go to your counseling sessions and follow-up appointments. · Recognize and accept your anxiety. Then, when you are in a situation that makes you anxious, say to yourself, \"This is not an emergency. I feel uncomfortable, but I am not in danger. I can keep going even if I feel anxious. \" · Be kind to your body: ¨ Relieve tension with exercise or a massage. ¨ Get enough rest. 
¨ Avoid alcohol, caffeine, nicotine, and illegal drugs. They can increase your anxiety level and cause sleep problems. ¨ Learn and do relaxation techniques. See below for more about these techniques. · Engage your mind. Get out and do something you enjoy. Go to a funny movie, or take a walk or hike. Plan your day. Having too much or too little to do can make you anxious. · Keep a record of your symptoms. Discuss your fears with a good friend or family member, or join a support group for people with similar problems. Talking to others sometimes relieves stress. · Get involved in social groups, or volunteer to help others. Being alone sometimes makes things seem worse than they are. · Get at least 30 minutes of exercise on most days of the week to relieve stress. Walking is a good choice. You also may want to do other activities, such as running, swimming, cycling, or playing tennis or team sports. Relaxation techniques Do relaxation exercises 10 to 20 minutes a day. You can play soothing, relaxing music while you do them, if you wish. · Tell others in your house that you are going to do your relaxation exercises. Ask them not to disturb you. · Find a comfortable place, away from all distractions and noise. · Lie down on your back, or sit with your back straight. · Focus on your breathing. Make it slow and steady. · Breathe in through your nose. Breathe out through either your nose or mouth. · Breathe deeply, filling up the area between your navel and your rib cage. Breathe so that your belly goes up and down. · Do not hold your breath. · Breathe like this for 5 to 10 minutes. Notice the feeling of calmness throughout your whole body. As you continue to breathe slowly and deeply, relax by doing the following for another 5 to 10 minutes: · Tighten and relax each muscle group in your body. You can begin at your toes and work your way up to your head. · Imagine your muscle groups relaxing and becoming heavy. · Empty your mind of all thoughts. · Let yourself relax more and more deeply. · Become aware of the state of calmness that surrounds you. · When your relaxation time is over, you can bring yourself back to alertness by moving your fingers and toes and then your hands and feet and then stretching and moving your entire body. Sometimes people fall asleep during relaxation, but they usually wake up shortly afterward. · Always give yourself time to return to full alertness before you drive a car or do anything that might cause an accident if you are not fully alert. Never play a relaxation tape while you drive a car. When should you call for help? Call 911 anytime you think you may need emergency care. For example, call if: 
  · You feel you cannot stop from hurting yourself or someone else.  
Edgar Jay the numbers for these national suicide hotlines: 6-184-787-TALK (3-415.418.2816) and 2-999-KUDFOOF (8-774.429.7207). If you or someone you know talks about suicide or feeling hopeless, get help right away. 
 Watch closely for changes in your health, and be sure to contact your doctor if: 
  · You have anxiety or fear that affects your life.  
  · You have symptoms of anxiety that are new or different from those you had before. Where can you learn more? Go to http://anneliese-isaura.info/. Enter P754 in the search box to learn more about \"Anxiety Disorder: Care Instructions. \" Current as of: December 7, 2017 Content Version: 11.7 © 3583-2567 [employer]. Care instructions adapted under license by [employer] (which disclaims liability or warranty for this information). If you have questions about a medical condition or this instruction, always ask your healthcare professional. [address] any warranty or liability for your use of this information. Paroxetine (By mouth) Paroxetine ([id number]) Treats depression, anxiety disorders, obsessive-compulsive disorder (OCD), and premenstrual dysphoric disorder (PMDD). Brisdelle treats hot flashes caused by menopause. This medicine is an SSRI. Brand Name(s): Brisdelle, Paxil, Paxil CR, Corwin Boudreaux There may be other brand names for this medicine. When This Medicine Should Not Be Used: This medicine is not right for everyone. Do not use it if you had an allergic reaction to paroxetine, or if you are pregnant. How to Use This Medicine:  
Capsule, Liquid, Tablet, Long Acting Tablet · Take your medicine as directed. Your dose may need to be changed several times to find what works best for you. · Oral liquid, Tablet, Extended-release Tablet: These are usually taken in the morning. · Brisdelle capsule: Take at bedtime. · Oral liquid: Measure the oral liquid medicine with a marked measuring spoon, oral syringe, or medicine cup. Shake the bottle well just before you measure each dose. · Tablet or Extended-release Tablet: Swallow whole. Do not crush, break, or chew it. Do not use an extended-release tablet that is cracked or chipped. · You may need to take this medicine for a month or longer before you feel better. If you feel that the medicine is not working well, do not take more than your normal dose. Call your doctor for instructions. · This medicine should come with a Medication Guide. Ask your pharmacist for a copy if you do not have one. · Missed dose: Take a dose as soon as you remember. If it is almost time for your next dose, wait until then and take a regular dose. Do not take extra medicine to make up for a missed dose. · Store the medicine in a closed container at room temperature, away from heat, moisture, and direct light. Drugs and Foods to Avoid: Ask your doctor or pharmacist before using any other medicine, including over-the-counter medicines, vitamins, and herbal products. · Do not use paroxetine and an MAO inhibitor within 14 days of each other. Do not use this medicine if you are using pimozide or thioridazine. · Some medicines can affect how paroxetine works. Tell your doctor if you are using any of the following: 
¨ Buspirone, cimetidine, digoxin, fentanyl, fosamprenavir/ritonavir, lithium, procyclidine, risperidone, Tiffany's wort, tamoxifen, theophylline, tramadol, or tryptophan supplements ¨ Amphetamines ¨ Blood thinner (including warfarin) ¨ Diuretic (water pill) ¨ Medicine for heart rhythm problems ¨ NSAID pain or arthritis medicine (including aspirin, celecoxib, diclofenac, ibuprofen, naproxen) ¨ Other medicine for depression or anxiety ¨ Phenothiazine medicine (including chlorpromazine, perphenazine, prochlorperazine, promethazine) ¨ Triptan medicine for migraine headaches · Do not drink alcohol while you are using this medicine. Warnings While Using This Medicine: · It is not safe to take this medicine during pregnancy. It could harm an unborn baby. Tell your doctor right away if you become pregnant. · Tell your doctor if you are breastfeeding, or if you have kidney disease, liver disease, glaucoma, or a history of epilepsy or seizures. · For some children, teenagers, and young adults, this medicine may increase mental or emotional problems.  This may lead to thoughts of suicide and violence. Talk with your doctor right away if you have any thoughts or behavior changes that concern you. Tell your doctor if you or anyone in your family has a history of bipolar disorder or suicide attempts. · This medicine may cause the following problems: 
¨ Serotonin syndrome (may be life-threatening when used with certain other medicines) ¨ Low sodium levels in the blood ¨ Higher risk of bleeding problems ¨ Higher risk of broken bones · Do not stop using this medicine suddenly. Your doctor will need to slowly decrease your dose before you stop it completely. · This medicine may make you dizzy or drowsy. Do not drive or do anything that could be dangerous until you know how this medicine affects you. · Keep all medicine out of the reach of children. Never share your medicine with anyone. Possible Side Effects While Using This Medicine:  
Call your doctor right away if you notice any of these side effects: · Allergic reaction: Itching or hives, swelling in your face or hands, swelling or tingling in your mouth or throat, chest tightness, trouble breathing · Anxiety, restlessness, fast heartbeat, fever, sweating, muscle spasms, nausea, vomiting, diarrhea, seeing or hearing things that are not there · Bone pain, tenderness, swelling, or bruising · Changes in behavior, thoughts of hurting yourself or others · Confusion, weakness, and muscle twitching · Eye pain, vision changes, seeing halos around lights · Trouble keeping still, feeling restless and agitated, racing thoughts, excessive energy, trouble sleeping · Unusual bleeding or bruising If you notice these less serious side effects, talk with your doctor: · Blurred vision, dizziness, drowsiness, or sleepiness · Constipation, upset stomach, loss of appetite, weight loss · Dry mouth · Sexual problems If you notice other side effects that you think are caused by this medicine, tell your doctor. Call your doctor for medical advice about side effects. You may report side effects to FDA at 1-384-FDA-6585 © 2017 [address] Information is for End User's use only and may not be sold, redistributed or otherwise used for commercial purposes. The above information is an  only. It is not intended as medical advice for individual conditions or treatments. Talk to your doctor, nurse or pharmacist before following any medical regimen to see if it is safe and effective for you. Introducing [facility] SERVICES! Kimberly De La Fuente introduces [employer] patient portal. Now you can access parts of your medical record, email your doctor's office, and request medication refills online. 1. In your internet browser, go to https://[employer]. [employer] 2. Click on the First Time User? Click Here link in the Sign In box. You will see the New Member Sign Up page. 3. Enter your [employer] Access Code exactly as it appears below. You will not need to use this code after youve completed the sign-up process. If you do not sign up before the expiration date, you must request a new code. · [employer] Access Code: [email or web address] Expires: 8/22/2018 10:36 AM 
 
4. Enter the last four digits of your Social Security Number (xxxx) and Date of Birth (mm/dd/yyyy) as indicated and click Submit. You will be taken to the next sign-up page. 5. Create a [employer] ID. This will be your [employer] login ID and cannot be changed, so think of one that is secure and easy to remember. 6. Create a [employer] password. You can change your password at any time. 7. Enter your Password Reset Question and Answer. This can be used at a later time if you forget your password. 8. Enter your e-mail address. You will receive e-mail notification when new information is available in [address] 9. Click Sign Up. You can now view and download portions of your medical record. 10. Click the Download Summary menu link to download a portable copy of your medical information. If you have questions, please visit the Frequently Asked Questions section of the [employer] website. Remember, [employer] is NOT to be used for urgent needs. For medical emergencies, dial 911. Now available from your iPhone and Android! Please provide this summary of care documentation to your next provider. Your primary care clinician is listed as Luz Marina Cantu. If you have any questions after today's visit, please call [phone].

## 2018-08-03 NOTE — PROGRESS NOTES
Tanya Fontaine [address]ina [address] Office [phone], Fax [phone] Subjective: Chief Complaint Patient presents with  Anxiety  
  needs anxiety medicine until psychiatry appointment  Hearing Problem History provided by patient HPI: 
Ludy Mancilla is a 19 y.o. AA female presents hearing problem and anxiety/ADHD. Significant history includes hx of swimmer's ear. Hearing deterioration 
- Started 2-3 years, feels like it is getting worse now (2-3x a day -> now throughout the day) - Trying to not wear ear buds - Treated for swimmers ear on 6/9 with zithromax and ear drops - cleared now 
- ROS: has ringing in her ears b/l and at times decreased sounds (happens with her panic attacks too), no room spinning, no ear ache. +HA (tension headache 2/wk) Anxiety and ADHD 
- had evaluation at [facility] psychiatry for anxiety and ADHD on June 20 - Appointment coming up on sept 4th. Was told to get her meds from her PCP w/o any records 
- ROS: anxious about everything (3 panic attacks in the last 3 months), says getting out of hand, PhQ9 =16, GAD7 =18 Medication reviewed. Allergy reviewed. Past Medical History:  
Diagnosis Date  Asthma Current Outpatient Prescriptions on File Prior to Visit Medication Sig Dispense Refill  AVIANE 0.1-20 mg-mcg tab  albuterol (PROVENTIL HFA, VENTOLIN HFA, PROAIR HFA) 90 mcg/actuation inhaler Take 2 Puffs by inhalation every four (4) hours as needed for Wheezing. 1 Inhaler 10  
 meloxicam (MOBIC) 7.5 mg tablet TAKE 1 TABLET BY MOUTH ONCE DAILY 10 Tab 0  carbamide peroxide (EAR DROPS OTC OT) by Otic route. No current facility-administered medications on file prior to visit. No Known Allergies No past surgical history on file. Social History Social History  Marital status: SINGLE Spouse name: N/A  
 Number of children: N/A  
 Years of education: N/A Occupational History  Not on file. Social History Main Topics  Smoking status: Current Every Day Smoker  Smokeless tobacco: Never Used  Alcohol use No  
 Drug use: No  
 Sexual activity: Yes Other Topics Concern  Not on file Social History Narrative Patient Active Problem List  
Diagnosis Code  Smoker F17.200  Patellar tendonitis of both knees M76.51, M76.52 Objective:  
Vitals - reviewed Visit Vitals  /83  Pulse 87  Temp 97.2 °F (36.2 °C) (Oral)  Resp 16  
 Ht 5' 2.21\" (1.58 m)  Wt 179 lb (81.2 kg)  LMP 08/03/2018  SpO2 99%  BMI 32.52 kg/m2 Physical exam:  
GEN: NAD. Alert. Well nourished. EYES: Conjunctiva clear; PERRLA. extraocular movements are intact. EAR: External ears are normal. Tympanic membranes are clear and without effusion. NOSE: Turbinates are within normal limits. No drainage, no frontal/maxillary tenderness OROPHYARYNX: No oral lesions or exudates. NECK:  Supple; no masses; thyroid normal          
LUNGS: Respirations unlabored; CTAB. no wheeze, rales, rhonchi CARDIOVASCULAR: Regular, rate, and rhythm without murmurs, gallops or rubs NEUROLOGIC: No focal neurologic deficits. Strength and sensation grossly intact. EXT: Well perfused. No edema. No erythema. SKIN: No obvious rashes or lesions. PSYCH: appropriate affect, calm, good insight and judgement Pertinent Labs/Studies: n/a Assessment and orders: ICD-10-CM ICD-9-CM 1. Tinnitus of both ears H93.13 388.30 REFERRAL TO ENT-OTOLARYNGOLOGY 2. Anxiety and depression F41.9 300.00 PARoxetine (PAXIL) 10 mg tablet F32.9 311 Diagnoses and all orders for this visit: 
 
1. Tinnitus of both ears. Unclear etiology (ddx vestibular migraine, meniere's, will need to r/o other causes). Worsening. Recent treatment for swimmers ear (normal exam). Hearing test normal. Not on ototoxic meds.  Will refer to ENT for further evaluation.  
-     REFERRAL TO ENT-OTOLARYNGOLOGY 2. Anxiety and depression. Phq9 16 and GAD7 18. Uncontrolled. Will start paxil. Pt is also evaluated for ADHD but no records, will not start any medication at this time. Informed pt to let her psychiatrist know or send us the documentation.  
-     PARoxetine (PAXIL) 10 mg tablet; Take 1 Tab by mouth daily. Follow-up Disposition: 
Return for 2 week med check . Pt was discussed with Dr Jennifer Covarrubias (attending physician). I have reviewed patient medical and social history and medications. I have reviewed pertinent labs results and other data. I have discussed the diagnosis with the patient and the intended plan as seen in the above orders. The patient has received an after-visit summary and questions were answered concerning future plans. I have discussed medication side effects and warnings with the patient as well. Olivia Hill MD 
[facility] 08/03/18

## 2018-08-03 NOTE — PROGRESS NOTES
Chief Complaint Patient presents with  Anxiety  
  needs anxiety medicine until psychiatry appointment  Hearing Problem

## 2018-08-03 NOTE — PATIENT INSTRUCTIONS
Anxiety Disorder: Care Instructions Your Care Instructions Anxiety is a normal reaction to stress. Difficult situations can cause you to have symptoms such as sweaty palms and a nervous feeling. In an anxiety disorder, the symptoms are far more severe. Constant worry, muscle tension, trouble sleeping, nausea and diarrhea, and other symptoms can make normal daily activities difficult or impossible. These symptoms may occur for no reason, and they can affect your work, school, or social life. Medicines, counseling, and self-care can all help. Follow-up care is a key part of your treatment and safety. Be sure to make and go to all appointments, and call your doctor if you are having problems. It's also a good idea to know your test results and keep a list of the medicines you take. How can you care for yourself at home? · Take medicines exactly as directed. Call your doctor if you think you are having a problem with your medicine. · Go to your counseling sessions and follow-up appointments. · Recognize and accept your anxiety. Then, when you are in a situation that makes you anxious, say to yourself, \"This is not an emergency. I feel uncomfortable, but I am not in danger. I can keep going even if I feel anxious. \" · Be kind to your body: ¨ Relieve tension with exercise or a massage. ¨ Get enough rest. 
¨ Avoid alcohol, caffeine, nicotine, and illegal drugs. They can increase your anxiety level and cause sleep problems. ¨ Learn and do relaxation techniques. See below for more about these techniques. · Engage your mind. Get out and do something you enjoy. Go to a funny movie, or take a walk or hike. Plan your day. Having too much or too little to do can make you anxious. · Keep a record of your symptoms. Discuss your fears with a good friend or family member, or join a support group for people with similar problems. Talking to others sometimes relieves stress.  
· Get involved in social groups, or volunteer to help others. Being alone sometimes makes things seem worse than they are. · Get at least 30 minutes of exercise on most days of the week to relieve stress. Walking is a good choice. You also may want to do other activities, such as running, swimming, cycling, or playing tennis or team sports. Relaxation techniques Do relaxation exercises 10 to 20 minutes a day. You can play soothing, relaxing music while you do them, if you wish. · Tell others in your house that you are going to do your relaxation exercises. Ask them not to disturb you. · Find a comfortable place, away from all distractions and noise. · Lie down on your back, or sit with your back straight. · Focus on your breathing. Make it slow and steady. · Breathe in through your nose. Breathe out through either your nose or mouth. · Breathe deeply, filling up the area between your navel and your rib cage. Breathe so that your belly goes up and down. · Do not hold your breath. · Breathe like this for 5 to 10 minutes. Notice the feeling of calmness throughout your whole body. As you continue to breathe slowly and deeply, relax by doing the following for another 5 to 10 minutes: · Tighten and relax each muscle group in your body. You can begin at your toes and work your way up to your head. · Imagine your muscle groups relaxing and becoming heavy. · Empty your mind of all thoughts. · Let yourself relax more and more deeply. · Become aware of the state of calmness that surrounds you. · When your relaxation time is over, you can bring yourself back to alertness by moving your fingers and toes and then your hands and feet and then stretching and moving your entire body. Sometimes people fall asleep during relaxation, but they usually wake up shortly afterward. · Always give yourself time to return to full alertness before you drive a car or do anything that might cause an accident if you are not fully alert.  Never play a relaxation tape while you drive a car. When should you call for help? Call 911 anytime you think you may need emergency care. For example, call if: 
  · You feel you cannot stop from hurting yourself or someone else.  
[facility] the numbers for these national suicide hotlines: 9-480-283-TALK (1-310.912.7756) and 1-634-TQVZVUT (1-647.111.1177). If you or someone you know talks about suicide or feeling hopeless, get help right away. 
 Watch closely for changes in your health, and be sure to contact your doctor if: 
  · You have anxiety or fear that affects your life.  
  · You have symptoms of anxiety that are new or different from those you had before. Where can you learn more? Go to http://annelieseFPSIisaura.info/. Enter P754 in the search box to learn more about \"Anxiety Disorder: Care Instructions. \" Current as of: December 7, 2017 Content Version: 11.7 © 6009-5705 [employer]. Care instructions adapted under license by [employer] (which disclaims liability or warranty for this information). If you have questions about a medical condition or this instruction, always ask your healthcare professional. [place] [address] any warranty or liability for your use of this information. Paroxetine (By mouth) Paroxetine ([id number]) Treats depression, anxiety disorders, obsessive-compulsive disorder (OCD), and premenstrual dysphoric disorder (PMDD). Brisdelle treats hot flashes caused by menopause. This medicine is an SSRI. Brand Name(s): Brisdelle, Paxil, Paxil JOSAFAT, Sharri Lopes There may be other brand names for this medicine. When This Medicine Should Not Be Used: This medicine is not right for everyone. Do not use it if you had an allergic reaction to paroxetine, or if you are pregnant. How to Use This Medicine:  
Capsule, Liquid, Tablet, Long Acting Tablet · Take your medicine as directed.  Your dose may need to be changed several times to find what works best for you. 
· Oral liquid, Tablet, Extended-release Tablet: These are usually taken in the morning. · Brisdelle capsule: Take at bedtime. · Oral liquid: Measure the oral liquid medicine with a marked measuring spoon, oral syringe, or medicine cup. Shake the bottle well just before you measure each dose. · Tablet or Extended-release Tablet: Swallow whole. Do not crush, break, or chew it. Do not use an extended-release tablet that is cracked or chipped. · You may need to take this medicine for a month or longer before you feel better. If you feel that the medicine is not working well, do not take more than your normal dose. Call your doctor for instructions. · This medicine should come with a Medication Guide. Ask your pharmacist for a copy if you do not have one. · Missed dose: Take a dose as soon as you remember. If it is almost time for your next dose, wait until then and take a regular dose. Do not take extra medicine to make up for a missed dose. · Store the medicine in a closed container at room temperature, away from heat, moisture, and direct light. Drugs and Foods to Avoid: Ask your doctor or pharmacist before using any other medicine, including over-the-counter medicines, vitamins, and herbal products. · Do not use paroxetine and an MAO inhibitor within 14 days of each other. Do not use this medicine if you are using pimozide or thioridazine. · Some medicines can affect how paroxetine works. Tell your doctor if you are using any of the following: 
¨ Buspirone, cimetidine, digoxin, fentanyl, fosamprenavir/ritonavir, lithium, procyclidine, risperidone, Tiffany's wort, tamoxifen, theophylline, tramadol, or tryptophan supplements ¨ Amphetamines ¨ Blood thinner (including warfarin) ¨ Diuretic (water pill) ¨ Medicine for heart rhythm problems ¨ NSAID pain or arthritis medicine (including aspirin, celecoxib, diclofenac, ibuprofen, naproxen) ¨ Other medicine for depression or anxiety ¨ Phenothiazine medicine (including chlorpromazine, perphenazine, prochlorperazine, promethazine) ¨ Triptan medicine for migraine headaches · Do not drink alcohol while you are using this medicine. Warnings While Using This Medicine: · It is not safe to take this medicine during pregnancy. It could harm an unborn baby. Tell your doctor right away if you become pregnant. · Tell your doctor if you are breastfeeding, or if you have kidney disease, liver disease, glaucoma, or a history of epilepsy or seizures. · For some children, teenagers, and young adults, this medicine may increase mental or emotional problems. This may lead to thoughts of suicide and violence. Talk with your doctor right away if you have any thoughts or behavior changes that concern you. Tell your doctor if you or anyone in your family has a history of bipolar disorder or suicide attempts. · This medicine may cause the following problems: 
¨ Serotonin syndrome (may be life-threatening when used with certain other medicines) ¨ Low sodium levels in the blood ¨ Higher risk of bleeding problems ¨ Higher risk of broken bones · Do not stop using this medicine suddenly. Your doctor will need to slowly decrease your dose before you stop it completely. · This medicine may make you dizzy or drowsy. Do not drive or do anything that could be dangerous until you know how this medicine affects you. · Keep all medicine out of the reach of children. Never share your medicine with anyone. Possible Side Effects While Using This Medicine:  
Call your doctor right away if you notice any of these side effects: · Allergic reaction: Itching or hives, swelling in your face or hands, swelling or tingling in your mouth or throat, chest tightness, trouble breathing · Anxiety, restlessness, fast heartbeat, fever, sweating, muscle spasms, nausea, vomiting, diarrhea, seeing or hearing things that are not there · Bone pain, tenderness, swelling, or bruising · Changes in behavior, thoughts of hurting yourself or others · Confusion, weakness, and muscle twitching · Eye pain, vision changes, seeing halos around lights · Trouble keeping still, feeling restless and agitated, racing thoughts, excessive energy, trouble sleeping · Unusual bleeding or bruising If you notice these less serious side effects, talk with your doctor: · Blurred vision, dizziness, drowsiness, or sleepiness · Constipation, upset stomach, loss of appetite, weight loss · Dry mouth · Sexual problems If you notice other side effects that you think are caused by this medicine, tell your doctor. Call your doctor for medical advice about side effects. You may report side effects to FDA at 1-071-FDA-1546 © 2017 [address] Information is for End User's use only and may not be sold, redistributed or otherwise used for commercial purposes. The above information is an  only. It is not intended as medical advice for individual conditions or treatments. Talk to your doctor, nurse or pharmacist before following any medical regimen to see if it is safe and effective for you.

## 2018-08-16 ENCOUNTER — OFFICE VISIT (OUTPATIENT)
Dept: FAMILY MEDICINE CLINIC | Age: 18
End: 2018-08-16

## 2018-08-16 VITALS
HEIGHT: 62 IN | TEMPERATURE: 98.3 F | BODY MASS INDEX: 32.02 KG/M2 | SYSTOLIC BLOOD PRESSURE: 113 MMHG | WEIGHT: 174 LBS | HEART RATE: 77 BPM | OXYGEN SATURATION: 98 % | DIASTOLIC BLOOD PRESSURE: 77 MMHG | RESPIRATION RATE: 18 BRPM

## 2018-08-16 DIAGNOSIS — F32.A ANXIETY AND DEPRESSION: Primary | ICD-10-CM

## 2018-08-16 DIAGNOSIS — F41.9 ANXIETY AND DEPRESSION: Primary | ICD-10-CM

## 2018-08-16 NOTE — PROGRESS NOTES
[address] [facility] [address] [place] [address] [facility]   Office [phone], Fax [phone]    Subjective:     Chief Complaint   Patient presents with    Anxiety     follow up, would like to increase dosage of meds    Depression       History provided by patient     HPI:  Lai Doll is a 19 y.o. OTHER female presents for anxiety and depression follow up. Significant history includes ADHD, depression, anxiety. Anxiety and depression follow up  - pt seen on 8/3 following her evaluation with [facility] psychiatry in hopes to get medications precribed for anxiety/depression and ADHD. Pt was started on paxil 10mg for anxiety and depression and told to follow up with psychiatry for recs on ADHD meds. Pt is doing well on paxil. No panic attack since start of medication. She feels like there is room for medication dose increase. No ADR except for insomnia although she mentions she has had that prior to start of paxil.  - ROS: tired during the day and then cant sleep (9:30pm - tried melatonin)  - PHq-9 = 9 (from 16) and GAD7 = 9 (from 18)    Medication reviewed. Allergy reviewed. ROS (bolded are positive):   General Negative for fever, chills, changes in weight, changes in appetite   CV Negative for chest pain, palpitations, edema   GI Negative for change in bowel habits, abdominal pain, black or bloody stools, nausea or vomiting   Neuro Negative for dizziness, headache, confusion, weakness   Psych Negative for anxiety, depression, inattention, change in mood     Past Medical History:   Diagnosis Date    Asthma        Current Outpatient Prescriptions on File Prior to Visit   Medication Sig Dispense Refill    PARoxetine (PAXIL) 10 mg tablet Take 1 Tab by mouth daily. 30 Tab 1    AVIANE 0.1-20 mg-mcg tab       albuterol (PROVENTIL HFA, VENTOLIN HFA, PROAIR HFA) 90 mcg/actuation inhaler Take 2 Puffs by inhalation every four (4) hours as needed for Wheezing.  1 Inhaler 10    meloxicam (MOBIC) 7.5 mg tablet TAKE 1 TABLET BY MOUTH ONCE DAILY 30 Tab 0    carbamide peroxide (EAR DROPS OTC OT) by Otic route. No current facility-administered medications on file prior to visit. No Known Allergies    No past surgical history on file. Social History     Social History    Marital status: SINGLE     Spouse name: N/A    Number of children: N/A    Years of education: N/A     Occupational History    Not on file. Social History Main Topics    Smoking status: Current Some Day Smoker    Smokeless tobacco: Never Used      Comment: ~ 10 cigarettes a week    Alcohol use No    Drug use: No    Sexual activity: Not Currently     Other Topics Concern    Not on file     Social History Narrative       Patient Active Problem List   Diagnosis Code    Smoker F17.200    Patellar tendonitis of both knees M76.51, M76.52     Objective:   Vitals - reviewed  Visit Vitals    /77 (BP 1 Location: Left arm, BP Patient Position: Sitting)    Pulse 77    Temp 98.3 °F (36.8 °C) (Oral)    Resp 18    Ht 5' 2.2\" (1.58 m)    Wt 174 lb (78.9 kg)    LMP 08/03/2018    SpO2 98%    BMI 31.62 kg/m2        Physical exam:   GEN: NAD. Alert. Well nourished. LUNGS: Respirations unlabored; CTAB. no wheeze, rales, rhonchi   CARDIOVASCULAR: Regular, rate, and rhythm without murmurs, gallops or rubs   NEUROLOGIC:  No focal neurologic deficits. Strength and sensation grossly intact. EXT: Well perfused. No edema. No erythema. Pertinent Labs/Studies: n/a    Assessment and orders:       ICD-10-CM ICD-9-CM    1. Anxiety and depression F41.9 300.00     F32.9 311      Diagnoses and all orders for this visit:    1. Anxiety and depression. Pt is improved. No major side effect from paxil although having some insomnia that patient says was there before start of paxil. Pt has appointment with psychiatry on Sept 4th. Will see patient after her visit. Will not make any changes to her medication as it has been only 2 weeks.      Follow-up Disposition:  Return in about 2 months (around 10/16/2018). Pt was discussed with Dr Viral Alicia (attending physician). I have reviewed patient medical and social history and medications. I have reviewed pertinent labs results and other data. I have discussed the diagnosis with the patient and the intended plan as seen in the above orders. The patient has received an after-visit summary and questions were answered concerning future plans. I have discussed medication side effects and warnings with the patient as well.     Agapito Wang MD  Resident [facility]  08/17/18

## 2018-08-16 NOTE — MR AVS SNAPSHOT
[address] 
[phone] Patient: Ry Dominguez MRN: [id number]:[id number] Visit Information Date & Time Provider Department Dept. Phone Encounter #  
 8/16/2018  3:00 PM Kp Vernon MD [facility] [phone] [id number] Follow-up Instructions Return in about 2 months (around 10/16/2018). Upcoming Health Maintenance Date Due Hepatitis A Peds Age 1-18 (1 of 2 - Standard Series) 4/23/1999 DTaP/Tdap/Td series (1 - Tdap) 4/23/2005 HPV Age 9Y-34Y (1 of 3 - Female 3 Dose Series) 4/23/2009 Pneumococcal 19-64 Medium Risk (1 of 1 - PPSV23) 4/23/2017 Influenza Age 5 to Adult 8/1/2018 Allergies as of 8/16/2018  Review Complete On: 8/16/2018 By: Kp Vernon MD  
 No Known Allergies Current Immunizations  Never Reviewed No immunizations on file. Not reviewed this visit You Were Diagnosed With   
  
 Codes Comments Anxiety and depression    -  Primary ICD-10-CM: F41.9, F32.9 ICD-9-CM: 300.00, 311 Vitals BP Pulse Temp Resp Height(growth percentile) Weight(growth percentile) 113/77 (BP 1 Location: Left arm, BP Patient Position: Sitting) 77 98.3 °F (36.8 °C) (Oral) 18 5' 2.2\" (1.58 m) 174 lb (78.9 kg) LMP SpO2 BMI Smoking Status 08/03/2018 98% 31.62 kg/m2 Current Some Day Smoker Vitals History BMI and BSA Data Body Mass Index Body Surface Area  
 31.62 kg/m 2 1.86 m 2 Preferred Pharmacy Pharmacy Name Phone Josey Re[facility] [address] [place] [address] Drive, [address] [facility] [address] [phone] Your Updated Medication List  
  
   
This list is accurate as of 8/16/18  3:53 PM.  Always use your most recent med list.  
  
  
  
  
 albuterol 90 mcg/actuation inhaler Commonly known as:  PROVENTIL HFA, VENTOLIN HFA, PROAIR HFA  
 Take 2 Puffs by inhalation every four (4) hours as needed for Wheezing. AVIANE 0.1-20 mg-mcg Tab Generic drug:  levonorgestrel-ethinyl estradiol EAR DROPS OTC OT  
by Otic route. meloxicam 7.5 mg tablet Commonly known as:  MOBIC  
TAKE 1 TABLET BY MOUTH ONCE DAILY PARoxetine 10 mg tablet Commonly known as:  PAXIL Take 1 Tab by mouth daily. Follow-up Instructions Return in about 2 months (around 10/16/2018). Patient Instructions Paroxetine (By mouth) Paroxetine ([id number]) Treats depression, anxiety disorders, obsessive-compulsive disorder (OCD), and premenstrual dysphoric disorder (PMDD). Brisdelle treats hot flashes caused by menopause. This medicine is an SSRI. Brand Name(s): Brisdelle, Paxil, Paxil CR, Karen Bibles There may be other brand names for this medicine. When This Medicine Should Not Be Used: This medicine is not right for everyone. Do not use it if you had an allergic reaction to paroxetine, or if you are pregnant. How to Use This Medicine:  
Capsule, Liquid, Tablet, Long Acting Tablet · Take your medicine as directed. Your dose may need to be changed several times to find what works best for you. · Oral liquid, Tablet, Extended-release Tablet: These are usually taken in the morning. · Brisdelle capsule: Take at bedtime. · Oral liquid: Measure the oral liquid medicine with a marked measuring spoon, oral syringe, or medicine cup. Shake the bottle well just before you measure each dose. · Tablet or Extended-release Tablet: Swallow whole. Do not crush, break, or chew it. Do not use an extended-release tablet that is cracked or chipped. · You may need to take this medicine for a month or longer before you feel better. If you feel that the medicine is not working well, do not take more than your normal dose. Call your doctor for instructions. · This medicine should come with a Medication Guide.  Ask your pharmacist for a copy if you do not have one. · Missed dose: Take a dose as soon as you remember. If it is almost time for your next dose, wait until then and take a regular dose. Do not take extra medicine to make up for a missed dose. · Store the medicine in a closed container at room temperature, away from heat, moisture, and direct light. Drugs and Foods to Avoid: Ask your doctor or pharmacist before using any other medicine, including over-the-counter medicines, vitamins, and herbal products. · Do not use paroxetine and an MAO inhibitor within 14 days of each other. Do not use this medicine if you are using pimozide or thioridazine. · Some medicines can affect how paroxetine works. Tell your doctor if you are using any of the following: 
¨ Buspirone, cimetidine, digoxin, fentanyl, fosamprenavir/ritonavir, lithium, procyclidine, risperidone, Tiffany's wort, tamoxifen, theophylline, tramadol, or tryptophan supplements ¨ Amphetamines ¨ Blood thinner (including warfarin) ¨ Diuretic (water pill) ¨ Medicine for heart rhythm problems ¨ NSAID pain or arthritis medicine (including aspirin, celecoxib, diclofenac, ibuprofen, naproxen) ¨ Other medicine for depression or anxiety ¨ Phenothiazine medicine (including chlorpromazine, perphenazine, prochlorperazine, promethazine) ¨ Triptan medicine for migraine headaches · Do not drink alcohol while you are using this medicine. Warnings While Using This Medicine: · It is not safe to take this medicine during pregnancy. It could harm an unborn baby. Tell your doctor right away if you become pregnant. · Tell your doctor if you are breastfeeding, or if you have kidney disease, liver disease, glaucoma, or a history of epilepsy or seizures. · For some children, teenagers, and young adults, this medicine may increase mental or emotional problems. This may lead to thoughts of suicide and violence.  Talk with your doctor right away if you have any thoughts or behavior changes that concern you. Tell your doctor if you or anyone in your family has a history of bipolar disorder or suicide attempts. · This medicine may cause the following problems: 
¨ Serotonin syndrome (may be life-threatening when used with certain other medicines) ¨ Low sodium levels in the blood ¨ Higher risk of bleeding problems ¨ Higher risk of broken bones · Do not stop using this medicine suddenly. Your doctor will need to slowly decrease your dose before you stop it completely. · This medicine may make you dizzy or drowsy. Do not drive or do anything that could be dangerous until you know how this medicine affects you. · Keep all medicine out of the reach of children. Never share your medicine with anyone. Possible Side Effects While Using This Medicine:  
Call your doctor right away if you notice any of these side effects: · Allergic reaction: Itching or hives, swelling in your face or hands, swelling or tingling in your mouth or throat, chest tightness, trouble breathing · Anxiety, restlessness, fast heartbeat, fever, sweating, muscle spasms, nausea, vomiting, diarrhea, seeing or hearing things that are not there · Bone pain, tenderness, swelling, or bruising · Changes in behavior, thoughts of hurting yourself or others · Confusion, weakness, and muscle twitching · Eye pain, vision changes, seeing halos around lights · Trouble keeping still, feeling restless and agitated, racing thoughts, excessive energy, trouble sleeping · Unusual bleeding or bruising If you notice these less serious side effects, talk with your doctor: · Blurred vision, dizziness, drowsiness, or sleepiness · Constipation, upset stomach, loss of appetite, weight loss · Dry mouth · Sexual problems If you notice other side effects that you think are caused by this medicine, tell your doctor. Call your doctor for medical advice about side effects.  You may report side effects to FDA at 1-614-FDA-2624 © 2017 [address] Information is for End User's use only and may not be sold, redistributed or otherwise used for commercial purposes. The above information is an  only. It is not intended as medical advice for individual conditions or treatments. Talk to your doctor, nurse or pharmacist before following any medical regimen to see if it is safe and effective for you. Introducing \[facility]\"" & HEALTH SERVICES! [facility] introduces mySBX patient portal. Now you can access parts of your medical record, email your doctor's office, and request medication refills online. 1. In your internet browser, go to [email or web address] 2. Click on the First Time User? Click Here link in the Sign In box. You will see the New Member Sign Up page. 3. Enter your mySBX Access Code exactly as it appears below. You will not need to use this code after youve completed the sign-up process. If you do not sign up before the expiration date, you must request a new code. · mySBX Access Code: [email or web address] Expires: 8/22/2018 10:36 AM 
 
4. Enter the last four digits of your Social Security Number (xxxx) and Date of Birth (mm/dd/yyyy) as indicated and click Submit. You will be taken to the next sign-up page. 5. Create a mySBX ID. This will be your mySBX login ID and cannot be changed, so think of one that is secure and easy to remember. 6. Create a mySBX password. You can change your password at any time. 7. Enter your Password Reset Question and Answer. This can be used at a later time if you forget your password. 8. Enter your e-mail address. You will receive e-mail notification when new information is available in [address] 9. Click Sign Up. You can now view and download portions of your medical record. 10. Click the Download Summary menu link to download a portable copy of your medical information. If you have questions, please visit the Frequently Asked Questions section of the [email or web address] website. Remember, InSpa is NOT to be used for urgent needs. For medical emergencies, dial 911. Now available from your iPhone and Android! Please provide this summary of care documentation to your next provider. Your primary care clinician is listed as Giovanna Camacho. If you have any questions after today's visit, please call [phone].

## 2018-08-16 NOTE — PROGRESS NOTES
Identified Patient with two Patient identifiers (Name and ). Two Patient Identifiers confirmed. Reviewed record in preparation for visit and have obtained necessary documentation. Chief Complaint   Patient presents with    Anxiety     follow up, would like to increase dosage of meds    Depression       Visit Vitals    /77 (BP 1 Location: Left arm, BP Patient Position: Sitting)    Pulse 77    Temp 98.3 °F (36.8 °C) (Oral)    Resp 18    Ht 5' 2.2\" (1.58 m)    Wt 174 lb (78.9 kg)    SpO2 98%    BMI 31.62 kg/m2       1. Have you been to the ER, urgent care clinic since your last visit? Hospitalized since your last visit? No    2. Have you seen or consulted any other health care providers outside of the [address] [place] [address] since your last visit? Include any pap smears or colon screening. No    Has an appointment with psychiatry 18. Would like to talk about getting something to help her sleep at night and to increase the medication she is on now.

## 2018-08-16 NOTE — PATIENT INSTRUCTIONS
Paroxetine (By mouth)   Paroxetine ([id number]-teen)  Treats depression, anxiety disorders, obsessive-compulsive disorder (OCD), and premenstrual dysphoric disorder (PMDD). Brisdelle treats hot flashes caused by menopause. This medicine is an SSRI. Brand Name(s): Brisdelle, Paxil, Paxil CR, Pexeva   There may be other brand names for this medicine. When This Medicine Should Not Be Used: This medicine is not right for everyone. Do not use it if you had an allergic reaction to paroxetine, or if you are pregnant. How to Use This Medicine:   Capsule, Liquid, Tablet, Long Acting Tablet  · Take your medicine as directed. Your dose may need to be changed several times to find what works best for you. · Oral liquid, Tablet, Extended-release Tablet: These are usually taken in the morning. · Brisdelle capsule: Take at bedtime. · Oral liquid: Measure the oral liquid medicine with a marked measuring spoon, oral syringe, or medicine cup. Shake the bottle well just before you measure each dose. · Tablet or Extended-release Tablet: Swallow whole. Do not crush, break, or chew it. Do not use an extended-release tablet that is cracked or chipped. · You may need to take this medicine for a month or longer before you feel better. If you feel that the medicine is not working well, do not take more than your normal dose. Call your doctor for instructions. · This medicine should come with a Medication Guide. Ask your pharmacist for a copy if you do not have one. · Missed dose: Take a dose as soon as you remember. If it is almost time for your next dose, wait until then and take a regular dose. Do not take extra medicine to make up for a missed dose. · Store the medicine in a closed container at room temperature, away from heat, moisture, and direct light. Drugs and Foods to Avoid:   Ask your doctor or pharmacist before using any other medicine, including over-the-counter medicines, vitamins, and herbal products.   · Do not use paroxetine and an MAO inhibitor within 14 days of each other. Do not use this medicine if you are using pimozide or thioridazine. · Some medicines can affect how paroxetine works. Tell your doctor if you are using any of the following:  ¨ Buspirone, cimetidine, digoxin, fentanyl, fosamprenavir/ritonavir, lithium, procyclidine, risperidone, Tiffany's wort, tamoxifen, theophylline, tramadol, or tryptophan supplements  ¨ Amphetamines  ¨ Blood thinner (including warfarin)  ¨ Diuretic (water pill)  ¨ Medicine for heart rhythm problems  ¨ NSAID pain or arthritis medicine (including aspirin, celecoxib, diclofenac, ibuprofen, naproxen)  ¨ Other medicine for depression or anxiety  ¨ Phenothiazine medicine (including chlorpromazine, perphenazine, prochlorperazine, promethazine)  ¨ Triptan medicine for migraine headaches  · Do not drink alcohol while you are using this medicine. Warnings While Using This Medicine:   · It is not safe to take this medicine during pregnancy. It could harm an unborn baby. Tell your doctor right away if you become pregnant. · Tell your doctor if you are breastfeeding, or if you have kidney disease, liver disease, glaucoma, or a history of epilepsy or seizures. · For some children, teenagers, and young adults, this medicine may increase mental or emotional problems. This may lead to thoughts of suicide and violence. Talk with your doctor right away if you have any thoughts or behavior changes that concern you. Tell your doctor if you or anyone in your family has a history of bipolar disorder or suicide attempts. · This medicine may cause the following problems:  ¨ Serotonin syndrome (may be life-threatening when used with certain other medicines)  ¨ Low sodium levels in the blood  ¨ Higher risk of bleeding problems  ¨ Higher risk of broken bones  · Do not stop using this medicine suddenly. Your doctor will need to slowly decrease your dose before you stop it completely.   · This medicine may make you dizzy or drowsy. Do not drive or do anything that could be dangerous until you know how this medicine affects you. · Keep all medicine out of the reach of children. Never share your medicine with anyone. Possible Side Effects While Using This Medicine:   Call your doctor right away if you notice any of these side effects:  · Allergic reaction: Itching or hives, swelling in your face or hands, swelling or tingling in your mouth or throat, chest tightness, trouble breathing  · Anxiety, restlessness, fast heartbeat, fever, sweating, muscle spasms, nausea, vomiting, diarrhea, seeing or hearing things that are not there  · Bone pain, tenderness, swelling, or bruising  · Changes in behavior, thoughts of hurting yourself or others  · Confusion, weakness, and muscle twitching  · Eye pain, vision changes, seeing halos around lights  · Trouble keeping still, feeling restless and agitated, racing thoughts, excessive energy, trouble sleeping  · Unusual bleeding or bruising  If you notice these less serious side effects, talk with your doctor:   · Blurred vision, dizziness, drowsiness, or sleepiness  · Constipation, upset stomach, loss of appetite, weight loss  · Dry mouth  · Sexual problems  If you notice other side effects that you think are caused by this medicine, tell your doctor. Call your doctor for medical advice about side effects. You may report side effects to FDA at 0-872-FDA-2163  © 2017 [address] Information is for End User's use only and may not be sold, redistributed or otherwise used for commercial purposes. The above information is an  only. It is not intended as medical advice for individual conditions or treatments. Talk to your doctor, nurse or pharmacist before following any medical regimen to see if it is safe and effective for you.

## 2018-09-27 ENCOUNTER — OFFICE VISIT (OUTPATIENT)
Dept: FAMILY MEDICINE CLINIC | Age: 18
End: 2018-09-27

## 2018-09-27 VITALS
RESPIRATION RATE: 20 BRPM | TEMPERATURE: 98.4 F | BODY MASS INDEX: 30.36 KG/M2 | HEART RATE: 92 BPM | DIASTOLIC BLOOD PRESSURE: 72 MMHG | SYSTOLIC BLOOD PRESSURE: 108 MMHG | WEIGHT: 165 LBS | OXYGEN SATURATION: 100 % | HEIGHT: 62 IN

## 2018-09-27 DIAGNOSIS — H66.91 RIGHT ACUTE OTITIS MEDIA: Primary | ICD-10-CM

## 2018-09-27 DIAGNOSIS — R52 BODY ACHES: ICD-10-CM

## 2018-09-27 DIAGNOSIS — J02.9 SORE THROAT: ICD-10-CM

## 2018-09-27 LAB
FLUAV+FLUBV AG NOSE QL IA.RAPID: NEGATIVE POS/NEG
FLUAV+FLUBV AG NOSE QL IA.RAPID: NEGATIVE POS/NEG
S PYO AG THROAT QL: NEGATIVE
VALID INTERNAL CONTROL?: YES
VALID INTERNAL CONTROL?: YES

## 2018-09-27 RX ORDER — AMOXICILLIN 875 MG/1
875 TABLET, FILM COATED ORAL 2 TIMES DAILY
Qty: 20 TAB | Refills: 0 | Status: SHIPPED | OUTPATIENT
Start: 2018-09-27 | End: 2018-10-07

## 2018-09-27 NOTE — PROGRESS NOTES
[address] [facility] [address] [place] [address]   Office [phone], Fax [phone]      Subjective:     CC:Ear pain and sore throat  History provided by patient     HPI:  Shanice Valles is a 19 y.o. OTHER female  presenting for sore throat and ear pain    Patient reports severe headache with congestion and generalized body aches for 2 days. Associated with cough but not sputum production. Also reports nausea and vomiting x 2. Denies sick contacts. Reports having loose stools yesterday after she had [employer] for breakfast and one loose stool this morning. Reports ear fullness in the right side. Reports chills but no fever. Denies sick contacts. Denies wheezing. Smokes 4-5 cigarettes per day for the past 10 years. Denies drinking alcohol. Works at [employer]. Of note,Reports history of asthma. Has used inhaler once yesterday and 4 weeks ago    Review of Systems   Constitutional: Negative for chills and fever. HENT: Negative for sore throat. Eyes: Negative for blurred vision. Respiratory: Positive for cough and sputum production. Negative for shortness of breath and wheezing. Cardiovascular: Negative for chest pain and leg swelling. Gastrointestinal: Positive for abdominal pain, nausea and vomiting. Genitourinary: Negative for dysuria. Past Medical History:   Diagnosis Date    Asthma        Current Outpatient Prescriptions on File Prior to Visit   Medication Sig Dispense Refill    meloxicam (MOBIC) 7.5 mg tablet TAKE 1 TABLET BY MOUTH ONCE DAILY 30 Tab 0    PARoxetine (PAXIL) 10 mg tablet Take 1 Tab by mouth daily. 30 Tab 1    AVIANE 0.1-20 mg-mcg tab       albuterol (PROVENTIL HFA, VENTOLIN HFA, PROAIR HFA) 90 mcg/actuation inhaler Take 2 Puffs by inhalation every four (4) hours as needed for Wheezing. 1 Inhaler 10    carbamide peroxide (EAR DROPS OTC OT) by Otic route. No current facility-administered medications on file prior to visit.         No Known Allergies    History reviewed. No pertinent surgical history. Social History     Social History    Marital status: SINGLE     Spouse name: N/A    Number of children: N/A    Years of education: N/A     Occupational History    Not on file. Social History Main Topics    Smoking status: Current Some Day Smoker    Smokeless tobacco: Never Used      Comment: ~ 10 cigarettes a week    Alcohol use No    Drug use: No    Sexual activity: Not Currently     Other Topics Concern    Not on file     Social History Narrative       Patient Active Problem List   Diagnosis Code    Smoker F17.200    Patellar tendonitis of both knees M76.51, M76.52         Objective:   Vitals - reviewed  Visit Vitals    /72    Pulse 92    Temp 98.4 °F (36.9 °C) (Oral)    Resp 20    Ht 5' 2.2\" (1.58 m)    Wt 165 lb (74.8 kg)    LMP 09/20/2018    SpO2 100%    BMI 29.99 kg/m2        Physical Exam   Constitutional: She appears well-developed and well-nourished. HENT:   Head: Normocephalic and atraumatic. Right Ear: Hearing normal. There is swelling and tenderness. No drainage. Tympanic membrane is injected and bulging. No middle ear effusion. Left Ear: Tympanic membrane and ear canal normal.   Nose: Nose normal.   Mouth/Throat: Uvula is midline, oropharynx is clear and moist and mucous membranes are normal. No oropharyngeal exudate. Eyes: Conjunctivae are normal. Pupils are equal, round, and reactive to light. Neck: Normal range of motion. Cardiovascular: Normal rate and regular rhythm. Pulmonary/Chest: Effort normal and breath sounds normal. No respiratory distress. She has no wheezes. She has no rales. Abdominal: Soft. Bowel sounds are normal.   Musculoskeletal: Normal range of motion. Assessment and orders:       ICD-10-CM ICD-9-CM    1. Right acute otitis media H66.91 382.9    2.  Sore throat J02.9 462 AMB POC RAPID STREP A      AMB POC ADELA INFLUENZA A/B TEST   3. Body aches R52 780.96 AMB POC RAPID STREP A      AMB POC ADELA INFLUENZA A/B TEST     Diagnoses and all orders for this visit:    1. Right acute otitis media: Pain in the right ear. Ear exam with erythematous bulging TM. No effusions. Left ear: Normal TM.   -     amoxicillin (AMOXIL) 875 mg tablet; Take 1 Tab by mouth two (2) times a day for 10 days. 2. Sore throat  -     AMB POC RAPID STREP A: Negative  -     AMB POC ADELA INFLUENZA A/B TEST Negative    3. Body aches: Tylenol/Motrin for pain control   -     AMB POC RAPID STREP A  -     AMB POC ADELA INFLUENZA A/B TEST      Pt was discussed with Dr. Jennifer Burton (attending physician). I have reviewed patient medical and social history and medications. I have reviewed pertinent labs results and other data. I have discussed the diagnosis with the patient and the intended plan as seen in the above orders. The patient has received an after-visit summary and questions were answered concerning future plans. I have discussed medication side effects and warnings with the patient as well.     Duc Golden MD  Resident [facility]  09/28/18

## 2018-09-27 NOTE — PATIENT INSTRUCTIONS
Ear Infection (Otitis Media): Care Instructions  Your Care Instructions    An ear infection may start with a cold and affect the middle ear (otitis media). It can hurt a lot. Most ear infections clear up on their own in a couple of days. Most often you will not need antibiotics. This is because many ear infections are caused by a virus. Antibiotics don't work against a virus. Regular doses of pain medicines are the best way to reduce your fever and help you feel better. Follow-up care is a key part of your treatment and safety. Be sure to make and go to all appointments, and call your doctor if you are having problems. It's also a good idea to know your test results and keep a list of the medicines you take. How can you care for yourself at home? · Take pain medicines exactly as directed. ¨ If the doctor gave you a prescription medicine for pain, take it as prescribed. ¨ If you are not taking a prescription pain medicine, take an over-the-counter medicine, such as acetaminophen (Tylenol), ibuprofen (Advil, Motrin), or naproxen (Aleve). Read and follow all instructions on the label. ¨ Do not take two or more pain medicines at the same time unless the doctor told you to. Many pain medicines have acetaminophen, which is Tylenol. Too much acetaminophen (Tylenol) can be harmful. · Plan to take a full dose of pain reliever before bedtime. Getting enough sleep will help you get better. · Try a warm, moist washcloth on the ear. It may help relieve pain. · If your doctor prescribed antibiotics, take them as directed. Do not stop taking them just because you feel better. You need to take the full course of antibiotics. When should you call for help?   Call your doctor now or seek immediate medical care if:    · You have new or increasing ear pain.     · You have new or increasing pus or blood draining from your ear.     · You have a fever with a stiff neck or a severe headache.    Watch closely for changes in your health, and be sure to contact your doctor if:    · You have new or worse symptoms.     · You are not getting better after taking an antibiotic for 2 days. Where can you learn more? Go to http://anneliese-isaura.info/. Enter Y145 in the search box to learn more about \"Ear Infection (Otitis Media): Care Instructions. \"  Current as of: May 12, 2017  Content Version: 11.7  © 3763-7992 [employer]. Care instructions adapted under license by [employer] (which disclaims liability or warranty for this information). If you have questions about a medical condition or this instruction, always ask your healthcare professional. [address] any warranty or liability for your use of this information.

## 2018-09-27 NOTE — PROGRESS NOTES
19year old female with headache, sore throat, nasal congestion    Denies fever or sick contacts    Per resident exam bulging R tympanic membrane    Rx given for amoxicillin    I reviewed with the resident the medical history and the resident's findings on the physical examination. I discussed with the resident the patient's diagnosis and concur with the plan.

## 2018-09-27 NOTE — PROGRESS NOTES
Chief Complaint   Patient presents with    Sore Throat     X2 cough,sore throat,conegstion,vomiting,diarrhea       1. Have you been to the ER, urgent care clinic since your last visit? Hospitalized since your last visit? No    2. Have you seen or consulted any other health care providers outside of the [facility] since your last visit? Include any pap smears or colon screening.  No

## 2018-09-27 NOTE — MR AVS SNAPSHOT
[address] 
[phone] Patient: Montez Warner MRN: [id number] DSE:5/60/2203 Visit Information Date & Time Provider Department Dept. Phone Encounter #  
 9/27/2018 11:15 AM Nelly Silva MD [address] [place] [address] [phone] [id number] Your Appointments 10/18/2018  2:30 PM  
ACUTE CARE with Garcia Dunlap MD  
[address] [place] [address] [address] [place] [address]) Appt Note: 10/16/2018, [facility], [facility], Routine Care, f/u from 8/16/18  
 [address] [place] [address]  
[phone]  
  
   
 [address] [place] [address] [phone] Upcoming Health Maintenance Date Due Hepatitis A Peds Age 1-18 (1 of 2 - Standard Series) 4/23/1999 DTaP/Tdap/Td series (1 - Tdap) 4/23/2005 HPV Age 9Y-34Y (1 of 3 - Female 3 Dose Series) 4/23/2009 Pneumococcal 19-64 Medium Risk (1 of 1 - PPSV23) 4/23/2017 Influenza Age 5 to Adult 8/1/2018 Allergies as of 9/27/2018  Review Complete On: 9/27/2018 By: Krunal Srinivasan LPN No Known Allergies Current Immunizations  Never Reviewed No immunizations on file. Not reviewed this visit You Were Diagnosed With   
  
 Codes Comments Sore throat    -  Primary ICD-10-CM: J02.9 ICD-9-CM: 046 Body aches     ICD-10-CM: R52 ICD-9-CM: 780.96 Right acute otitis media     ICD-10-CM: H66.91 
ICD-9-CM: 382. 9 Vitals BP Pulse Temp Resp Height(growth percentile) Weight(growth percentile) 108/72 92 98.4 °F (36.9 °C) (Oral) 20 5' 2.2\" (1.58 m) 165 lb (74.8 kg) LMP SpO2 BMI OB Status Smoking Status 09/20/2018 100% 29.99 kg/m2 Having regular periods Current Some Day Smoker Vitals History BMI and BSA Data Body Mass Index Body Surface Area  
 29.99 kg/m 2 1.81 m 2 Preferred Pharmacy Pharmacy Name Phone [address], [address] [facility] [address] [phone] Your Updated Medication List  
  
   
This list is accurate as of 9/27/18 11:56 AM.  Always use your most recent med list.  
  
  
  
  
 albuterol 90 mcg/actuation inhaler Commonly known as:  PROVENTIL HFA, VENTOLIN HFA, PROAIR HFA Take 2 Puffs by inhalation every four (4) hours as needed for Wheezing. amoxicillin 875 mg tablet Commonly known as:  AMOXIL Take 1 Tab by mouth two (2) times a day for 10 days. AVIANE 0.1-20 mg-mcg Tab Generic drug:  levonorgestrel-ethinyl estradiol EAR DROPS OTC OT  
by Otic route. meloxicam 7.5 mg tablet Commonly known as:  MOBIC  
TAKE 1 TABLET BY MOUTH ONCE DAILY PARoxetine 10 mg tablet Commonly known as:  PAXIL Take 1 Tab by mouth daily. Prescriptions Sent to Pharmacy Refills  
 amoxicillin (AMOXIL) 875 mg tablet 0 Sig: Take 1 Tab by mouth two (2) times a day for 10 days. Class: Normal  
 Pharmacy: [facility] [address] CAMILO YUAN [address], [address] [facility] [address] Ph #: [phone] Route: Oral  
  
We Performed the Following AMB POC RAPID STREP A [02181 CPT(R)] AMB POC ADELA INFLUENZA A/B TEST [43954 CPT(R)] Patient Instructions Ear Infection (Otitis Media): Care Instructions Your Care Instructions An ear infection may start with a cold and affect the middle ear (otitis media). It can hurt a lot. Most ear infections clear up on their own in a couple of days. Most often you will not need antibiotics. This is because many ear infections are caused by a virus. Antibiotics don't work against a virus. Regular doses of pain medicines are the best way to reduce your fever and help you feel better. Follow-up care is a key part of your treatment and safety.  Be sure to make and go to all appointments, and call your doctor if you are having problems. It's also a good idea to know your test results and keep a list of the medicines you take. How can you care for yourself at home? · Take pain medicines exactly as directed. ¨ If the doctor gave you a prescription medicine for pain, take it as prescribed. ¨ If you are not taking a prescription pain medicine, take an over-the-counter medicine, such as acetaminophen (Tylenol), ibuprofen (Advil, Motrin), or naproxen (Aleve). Read and follow all instructions on the label. ¨ Do not take two or more pain medicines at the same time unless the doctor told you to. Many pain medicines have acetaminophen, which is Tylenol. Too much acetaminophen (Tylenol) can be harmful. · Plan to take a full dose of pain reliever before bedtime. Getting enough sleep will help you get better. · Try a warm, moist washcloth on the ear. It may help relieve pain. · If your doctor prescribed antibiotics, take them as directed. Do not stop taking them just because you feel better. You need to take the full course of antibiotics. When should you call for help? Call your doctor now or seek immediate medical care if: 
  · You have new or increasing ear pain.  
  · You have new or increasing pus or blood draining from your ear.  
  · You have a fever with a stiff neck or a severe headache.  
 Watch closely for changes in your health, and be sure to contact your doctor if: 
  · You have new or worse symptoms.  
  · You are not getting better after taking an antibiotic for 2 days. Where can you learn more? Go to http://anneliese-isaura.info/. Enter T937 in the search box to learn more about \"Ear Infection (Otitis Media): Care Instructions. \" Current as of: May 12, 2017 Content Version: 11.7 © 6665-6431 [employer]. Care instructions adapted under license by [employer] (which disclaims liability or warranty for this information).  If you have questions about a medical condition or this instruction, always ask your healthcare professional. [address] any warranty or liability for your use of this information. Introducing [facility]! Charly Leon introduces "[employer] patient portal. Now you can access parts of your medical record, email your doctor's office, and request medication refills online. 1. In your internet browser, go to [email or web address] 2. Click on the First Time User? Click Here link in the Sign In box. You will see the New Member Sign Up page. 3. Enter your "[employer] Access Code exactly as it appears below. You will not need to use this code after youve completed the sign-up process. If you do not sign up before the expiration date, you must request a new code. · "[employer] Access Code: [email or web address] Expires: 12/26/2018 11:56 AM 
 
4. Enter the last four digits of your Social Security Number (xxxx) and Date of Birth (mm/dd/yyyy) as indicated and click Submit. You will be taken to the next sign-up page. 5. Create a "[employer] ID. This will be your "[employer] login ID and cannot be changed, so think of one that is secure and easy to remember. 6. Create a "[employer] password. You can change your password at any time. 7. Enter your Password Reset Question and Answer. This can be used at a later time if you forget your password. 8. Enter your e-mail address. You will receive e-mail notification when new information is available in [address] 9. Click Sign Up. You can now view and download portions of your medical record. 10. Click the Download Summary menu link to download a portable copy of your medical information. If you have questions, please visit the Frequently Asked Questions section of the "[employer] website. Remember, "[employer] is NOT to be used for urgent needs. For medical emergencies, dial 911. Now available from your iPhone and Android! Please provide this summary of care documentation to your next provider. Your primary care clinician is listed as Deon Steele. If you have any questions after today's visit, please call [phone].

## 2018-10-18 ENCOUNTER — OFFICE VISIT (OUTPATIENT)
Dept: FAMILY MEDICINE CLINIC | Age: 18
End: 2018-10-18

## 2018-10-18 VITALS
BODY MASS INDEX: 30.14 KG/M2 | RESPIRATION RATE: 16 BRPM | TEMPERATURE: 98.6 F | HEART RATE: 84 BPM | WEIGHT: 163.8 LBS | OXYGEN SATURATION: 100 % | HEIGHT: 62 IN | SYSTOLIC BLOOD PRESSURE: 111 MMHG | DIASTOLIC BLOOD PRESSURE: 74 MMHG

## 2018-10-18 DIAGNOSIS — F32.A DEPRESSION, UNSPECIFIED DEPRESSION TYPE: Primary | ICD-10-CM

## 2018-10-18 DIAGNOSIS — F41.9 ANXIETY: ICD-10-CM

## 2018-10-18 DIAGNOSIS — Z23 ENCOUNTER FOR IMMUNIZATION: ICD-10-CM

## 2018-10-18 DIAGNOSIS — N94.6 DYSMENORRHEA: ICD-10-CM

## 2018-10-18 RX ORDER — CLONIDINE HYDROCHLORIDE 0.1 MG/1
TABLET ORAL
COMMUNITY
Start: 2018-10-16

## 2018-10-18 NOTE — PATIENT INSTRUCTIONS
Follow up with your psychiatrist  Continue seeing your therapist/counselor    Follow up after you think about which method you would like to choose     Learning About Birth Control  What is birth control? Birth control is any method used to prevent pregnancy. Another word for birth control is contraception. If you have sex without birth control, there is a chance that you could get pregnant. This is true even if you have not started having periods yet or you are getting close to menopause. The only sure way to prevent pregnancy is to not have sex. But finding a good method of birth control that you are comfortable with can help you avoid an unplanned pregnancy. Be sure to tell your doctor about any health problems you have or medicines you take. He or she can help you choose the birth control method that is right for you. What are the types of birth control? There are many different kinds of birth control. Each has pros and cons. Learning about all the methods will help you find one that is right for you. · Long-acting reversible contraception (LARC) is the best reversible method you can use to prevent pregnancy. If you decide you want to get pregnant, you can have them removed. LARCs are implants and intrauterine devices (IUDs). While they are being used, they usually prevent pregnancy for years. ? Implants are placed under the skin of the arm. They release the hormone progestin and prevent pregnancy for about 3 years. ? IUDs are placed in the uterus by a doctor. There are two main types of IUDs--the copper IUD and the hormonal IUD. The hormonal IUD releases progestin. IUDs prevent pregnancy for 3 to 10 years, depending on the type. · Hormonal methods are very good at preventing pregnancy. Combination birth control pills (\"the pill\"), skin patches, and vaginal rings release the hormones estrogen and progestin. Shots, mini-pills, hormonal IUDs, and implants release progestin only.   · Barrier methods generally do not prevent pregnancy as well as IUDs or hormonal methods do. Barrier methods include condoms, diaphragms, cervical caps, and sponges. You must use barrier methods every time you have sex. · Natural family planning can work if you and your partner are very careful and you have a regular ovulation cycle. You will need to keep good records so you know when you are most likely to become pregnant (you are fertile). And during times you are fertile, you will need to not have sex or to use a barrier method. Natural family planning is also known as fertility awareness and the rhythm method. · Permanent birth control (sterilization) gives you lasting protection against pregnancy. A man can have a vasectomy, or a woman can have her tubes tied (tubal ligation) or blocked (tubal implant). But this is only a good choice if you are sure that you don't want any (or any more) children. · Emergency contraception, such as the morning-after pill (Plan B), is a backup method to prevent pregnancy if you forget to use birth control or a condom breaks. You can use emergency contraception for up to 5 days after having had sex, but it works best if you take it right away. It is a good idea to keep emergency birth control on hand as backup protection. You can get emergency contraception without a prescription at most drugstores. How do you choose the best method? The best method of birth control is one that protects you every time you have sex. This usually depends on how well you use it. To find a method that will work best for you, think about:  · How well it works. Think about how important it is to you to avoid pregnancy. Then look at how well each method works. For example, if you plan to have a child soon anyway, you may not need a very reliable method. If you don't want children but feel it is wrong to end a pregnancy, choose a type of birth control that works very well. · How much effort it takes.  For example, birth control pills may not be a good choice if you often forget to take medicine. Or, if you are not sure you will stop and use a barrier method each time you have sex, pick another method. · How much the method costs. For example, condoms are cheap or free in some clinics. Some insurance companies cover the cost of prescription birth control. But cost can sometimes be misleading. An IUD costs a lot up front. But it works for years, making it low-cost over time. · Whether it protects you from infection. Latex condoms can help protect you from sexually transmitted infections (STIs), such as herpes or HIV/AIDS. But they are not the best way to prevent pregnancy. To avoid both STIs and pregnancy, use condoms along with another type of birth control. · Whether you've had a problem with one kind of birth control. Finding the best method of birth control may involve trying something different. Also, you may need to change a method that once worked well for you. · Whether you want children. If you are positive you don't want children, a lasting method of birth control might be best.  · Your health issues. Some birth control methods may not be safe for you, depending on your health issues. For example, women who smoke, are breastfeeding, or have had breast cancer may not be able to use certain methods. How can you get birth control? · You can buy:  ? Condoms, sponges, and spermicides without a prescription in drugstores, online, and in many grocery stores. ? Emergency contraception without a prescription at most drugstores. · You need to see a doctor to:  ? Get a prescription for birth control pills and other methods that use hormones. ? Have an IUD or implant inserted. ? Be fitted for a diaphragm or cervical cap. Where can you learn more? Go to http://anneliese-isaura.info/. Enter Z912 in the search box to learn more about \"Learning About Birth Control. \"  Current as of: November 21, 2017  Content Version: 11.8  © 4210-2171 [employer]. Care instructions adapted under license by [employer] (which disclaims liability or warranty for this information). If you have questions about a medical condition or this instruction, always ask your healthcare professional. [address] any warranty or liability for your use of this information. Combination Birth Control Pills: Care Instructions  Your Care Instructions    Combination birth control pills are used to prevent pregnancy. They give you a regular dose of the hormones estrogen and progestin. You take a hormone pill every day to prevent pregnancy. Birth control pills come in packs. The most common type has 3 weeks of hormone pills. Some packs have sugar pills (they do not contain any hormones) for the fourth week. During that fourth no-hormone week, you have your period. After the fourth week (28 days), you start a new pack. Some birth control pills are packaged in different ways. For example, some have hormone pills for the fourth week instead of sugar pills. Taking hormones for the entire month causes you to not have periods or to have fewer periods. Others are packaged so that you have a period every 3 months. Your doctor will tell you what type of pills you have. Follow-up care is a key part of your treatment and safety. Be sure to make and go to all appointments, and call your doctor if you are having problems. It's also a good idea to know your test results and keep a list of the medicines you take. How can you care for yourself at home? How do you take the pill? · Follow your doctor's instructions about when to start taking your pills. Use backup birth control, such as a condom, or don't have intercourse for 7 days after you start your pills. · Take your pills every day, at about the same time of day.  To help yourself do this, try to take them when you do something else every day, such as brushing your teeth. What if you forget to take a pill? Always read the label for specific instructions, or call your doctor. Here are some basic guidelines:  · If you miss 1 hormone pill, take it as soon as you remember. Ask your doctor if you may need to use a backup birth control method, such as a condom, or not have intercourse. · If you miss 2 or more hormone pills, take one as soon as you remember you forgot them. Then read the pill label or call your doctor about instructions on how to take your missed pills. Use a backup method of birth control or don't have intercourse for 7 days. Pregnancy is more likely if you miss more than 1 pill. · If you had intercourse, you can use emergency contraception, such as the morning-after pill (Plan B). You can use emergency contraception for up to 5 days after having had intercourse, but it works best if you take it right away. What else do you need to know? · The pill has side effects. ? You may have very light or skipped periods. ? You may have bleeding between periods (spotting). This usually decreases after 3 to 4 months. ? You may have mood changes, less interest in sex, or weight gain. · The pill may reduce acne, heavy bleeding and cramping, and symptoms of premenstrual syndrome. · Check with your doctor before you use any other medicines, including over-the-counter medicines, vitamins, herbal products, and supplements. Birth control hormones may not work as well to prevent pregnancy when combined with other medicines. · The pill doesn't protect against sexually transmitted infection (STIs), such as herpes or HIV/AIDS. If you're not sure whether your sex partner might have an STI, use a condom to protect against disease. When should you call for help?   Call your doctor now or seek immediate medical care if:    · You have severe belly pain.     · You have signs of a blood clot, such as:  ? Pain in your calf, back of the knee, thigh, or groin.  ? Redness and swelling in your leg or groin.     · You have blurred vision or other problems seeing.     · You have a severe headache.     · You have severe trouble breathing.    Watch closely for changes in your health, and be sure to contact your doctor if:    · You think you might be pregnant.     · You think you may be depressed.     · You think you may have been exposed to or have a sexually transmitted infection. Where can you learn more? Go to http://anneliese-isaura.info/. Enter T533 in the search box to learn more about \"Combination Birth Control Pills: Care Instructions. \"  Current as of: November 21, 2017  Content Version: 11.8  © 4471-2160 [employer]. Care instructions adapted under license by [employer] (which disclaims liability or warranty for this information). If you have questions about a medical condition or this instruction, always ask your healthcare professional. [place] [address] any warranty or liability for your use of this information. Learning About Birth Control: Intrauterine Device (IUD)  What is an intrauterine device (IUD)? The intrauterine device (IUD) is used to prevent pregnancy. It's a small, plastic, T-shaped device. Your doctor places the IUD in your uterus. You have a choice between a hormonal IUD and a copper IUD. The hormonal IUD can prevent pregnancy for 3 to 5 years, depending on which IUD is used. Once you have it, you don't have to do anything else to prevent pregnancy. The copper IUD can prevent pregnancy for 10 years. Once you have it, you don't have to do anything else to prevent pregnancy. A string tied to the end of the IUD hangs down through the opening of the uterus (called the cervix) into the vagina. You can check that the IUD is in place by feeling for the string. The IUD usually stays in the uterus until your doctor removes it. How well does it work?   In the first year of use:  · When the hormonal IUD is used exactly as directed, fewer than 1 woman out of 100 has an unplanned pregnancy. · When the copper IUD is used exactly as directed, fewer than 1 woman out of 100 has an unplanned pregnancy. Be sure to tell your doctor about any health problems you have or medicines you take. He or she can help you choose the birth control method that is right for you. What are the advantages of an IUD? · An IUD is one of the most effective methods of birth control. · It prevents pregnancy for 3 to 10 years, depending on the type. You don't have to worry about birth control during this time. · It's safe to use while breastfeeding. · IUDs don't contain estrogen. So you can use an IUD if you don't want to take estrogen or can't take estrogen because you have certain health problems or concerns. · An IUD is convenient. It is always providing birth control. You don't need to remember to take a pill or get a shot. You don't have to interrupt sex to protect against pregnancy. · A hormonal IUD may reduce heavy bleeding and cramping. What are the disadvantages of an IUD? · An IUD doesn't protect against sexually transmitted infections (STIs), such as herpes or HIV/AIDS. If you aren't sure if your sex partner might have an STI, use a condom to protect against disease. · A copper IUD may cause periods with more bleeding and cramping. · You have to see a doctor to have an IUD inserted and removed. · You have to check to see if the string is in place. Where can you learn more? Go to http://anneliese-isaura.info/. Enter I233 in the search box to learn more about \"Learning About Birth Control: Intrauterine Device (IUD). \"  Current as of: November 21, 2017  Content Version: 11.8  © 5518-9475 [employer]. Care instructions adapted under license by [employer] (which disclaims liability or warranty for this information).  If you have questions about a medical condition or this instruction, always ask your healthcare professional. [address] any warranty or liability for your use of this information. Intrauterine Device (IUD) Insertion: Care Instructions  Your Care Instructions    The intrauterine device (IUD) is a very effective method of birth control. It is a small, plastic, T-shaped device that contains copper or hormones. The doctor inserts the IUD into your uterus. A plastic string tied to the end of the IUD hangs down through the cervix into the vagina. There are two types of IUDs. The copper IUD is effective for up to 10 years. The hormonal IUD is effective for either 3 years or 5 years, depending on which IUD is used. The hormonal IUD also reduces menstrual bleeding and cramping. Both types of IUD damage or kill the man's sperm. This means that the woman's egg does not join with the sperm. IUDs also change the lining of the uterus so that the egg does not lodge there. The IUD is most likely to work well for women who have been pregnant before. Some women who have never been pregnant have more trouble keeping the IUD in the uterus. They also may have more pain and cramping after insertion. Follow-up care is a key part of your treatment and safety. Be sure to make and go to all appointments, and call your doctor if you are having problems. It's also a good idea to know your test results and keep a list of the medicines you take. How can you care for yourself at home? · You may experience some mild cramping and light bleeding (spotting) for 1 or 2 days. Use a hot water bottle or a heating pad set on low on your belly for pain. · Take an over-the-counter pain medicine, such as acetaminophen (Tylenol), ibuprofen (Advil, Motrin), and naproxen (Aleve) if needed. Read and follow all instructions on the label. · Do not take two or more pain medicines at the same time unless the doctor told you to.  Many pain medicines have acetaminophen, which is Tylenol. Too much acetaminophen (Tylenol) can be harmful. · Check the string of your IUD after every period. To do this, insert a finger into your vagina and feel for the cervix, which is at the top of the vagina and feels harder than the rest of your vagina. You should be able to feel the thin, plastic string coming out of the opening of your cervix. If you cannot feel the string, use another form of birth control and make an appointment with your doctor to have the string checked. · If the IUD comes out, save it and call your doctor. Be sure to use another form of birth control while the IUD is out. · Use latex condoms to protect against sexually transmitted infections (STIs), such as gonorrhea and chlamydia. An IUD does not protect you from STIs. Having one sex partner (who does not have STIs and does not have sex with anyone else) is a good way to avoid STIs. When should you call for help? Call 911 anytime you think you may need emergency care. For example, call if:    · You passed out (lost consciousness).     · You have sudden, severe pain in your belly or pelvis.    Call your doctor now or seek immediate medical care if:    · You have new belly or pelvic pain.     · You have severe vaginal bleeding. This means that you are soaking through your usual pads or tampons each hour for 2 or more hours.     · You are dizzy or lightheaded, or you feel like you may faint.     · You have a fever and pelvic pain or vaginal discharge.     · You have pelvic pain that is getting worse.    Watch closely for changes in your health, and be sure to contact your doctor if:    · You cannot feel the string, or the IUD comes out.     · You feel sick to your stomach, or you vomit.     · You think you may be pregnant. Where can you learn more? Go to http://anneliese-isaura.info/.   Enter Z537 in the search box to learn more about \"Intrauterine Device (IUD) Insertion: Care Instructions. \"  Current as of: November 21, 2017  Content Version: 11.8  © 7467-9244 [employer]. Care instructions adapted under license by [employer] (which disclaims liability or warranty for this information). If you have questions about a medical condition or this instruction, always ask your healthcare professional. [address] any warranty or liability for your use of this information. Learning About Birth Control: The Shot  What is the shot? The shot is used to prevent pregnancy. You get the shot in your upper arm or rear end (buttocks). The shot gives you a dose of the hormone progestin. The shot is often called by its brand name, Depo-Provera. Progestin prevents pregnancy in these ways: It thickens the mucus in the cervix. This makes it hard for sperm to travel into the uterus. It also thins the lining of the uterus, which makes it harder for a fertilized egg to attach to the uterus. Progestin can sometimes stop the ovaries from releasing an egg each month (ovulation). The shot provides birth control for 3 months at a time. You then need another shot. The shot may cause bone loss. Talk to your doctor about the risks and benefits. How well does it work? In the first year of use:  · When the shot is used exactly as directed, fewer than 1 woman out of 100 has an unplanned pregnancy. · When the shot is not used exactly as directed, 6 women out of 100 have an unplanned pregnancy. Be sure to tell your doctor about any health problems you have or medicines you take. He or she can help you choose the birth control method that is right for you. What are the advantages of the shot? · The shot is one of the most effective methods of birth control. · It's convenient. You need to get a shot only once every 3 months to prevent pregnancy. You don't have to interrupt sex to protect against pregnancy. · It prevents pregnancy for 3 months at a time.  You don't have to worry about birth control for this time. · It's safe to use while breastfeeding. · The shot may reduce heavy bleeding and cramping. · The shot doesn't contain estrogen. So you can use it if you don't want to take estrogen or can't take estrogen because you have certain health problems or concerns. What are the disadvantages of the shot? · The shot doesn't protect against sexually transmitted infections (STIs), such as herpes or HIV/AIDS. If you aren't sure if your sex partner might have an STI, use a condom to protect against disease. · The shot may cause bone loss in some women. Talk to your doctor about the risks and benefits. · Any side effects may last up to 3 months. ? The shot may cause irregular periods, or you may have spotting between periods. You may also stop getting a period. Some women see having no period as an advantage. ? It may cause mood changes, less interest in sex, or weight gain. · You must go to the doctor every 3 months to get the shot. · If you want to get pregnant, it may take 9 to 10 months after you stop getting the shot. This is because the hormones the shot provided have to leave your system, and your body has to readjust.  · If you have severe side effects, you have to wait for the hormones to get out of your system. This may take up to 3 months. Where can you learn more? Go to http://anneliese-isaura.info/. Enter G237 in the search box to learn more about \"Learning About Birth Control: The Shot. \"  Current as of: November 21, 2017  Content Version: 11.8  © 5790-5568 [employer]. Care instructions adapted under license by [employer] (which disclaims liability or warranty for this information). If you have questions about a medical condition or this instruction, always ask your healthcare professional. [address] any warranty or liability for your use of this information.          Implant for Birth Control: Care Instructions  Your Care Instructions    The implant is used to prevent pregnancy. It's a thin gail about the size of a matchstick that is inserted under the skin (subdermal) on the inside of your arm. The implant prevents pregnancy for 3 years. After it is put in, you don't have to do anything else to prevent pregnancy. Follow-up care is a key part of your treatment and safety. Be sure to make and go to all appointments, and call your doctor if you are having problems. It's also a good idea to know your test results and keep a list of the medicines you take. How can you care for yourself at home? How do you use the subdermal implant? · The implant is put in and taken out by your doctor or another trained health professional. This is done in your doctor's office. It only takes a few minutes. · Ask your doctor if you need to use backup birth control, such as a condom. And ask if (and how long) you should avoid intercourse after you get the implant. You may need to do this, depending on where you are in your cycle. What else do you need to know? · The implant has side effects. ? You may have changes in your period. Your period may stop. You may also have spotting or bleeding between periods. ? You may have mood changes, less interest in sex, or weight gain. · Remember that 3 years after you receive the implant, you must have it removed or get a new one.  ? If you don't replace the implant and don't use another form of birth control, you could get pregnant. ? If you have the implant removed, you'll have to find another method of birth control. If you don't, you may get pregnant. ? Even if you are planning to get pregnant, you have to have the implant removed. · Check with your doctor before you use any other medicines. This includes over-the-counter medicines, vitamins, herbal products, and supplements.  Birth control hormones may not work as well to prevent pregnancy when combined with other medicines. · The implant doesn't protect against sexually transmitted infections (STIs), such as herpes or HIV/AIDS. If you're not sure if your sex partner might have an STI, use a condom to protect against infection. When should you call for help? Watch closely for changes in your health, and be sure to contact your doctor if you have any problems. Where can you learn more? Go to http://anneliese-isaura.info/. Enter G520 in the search box to learn more about \"Implant for Birth Control: Care Instructions. \"  Current as of: November 21, 2017  Content Version: 11.8  © 2467-6581 [employer]. Care instructions adapted under license by [employer] (which disclaims liability or warranty for this information). If you have questions about a medical condition or this instruction, always ask your healthcare professional. [address] any warranty or liability for your use of this information.

## 2018-10-18 NOTE — PROGRESS NOTES
Ana Paula Bird is a 19 y.o. female  Chief Complaint   Patient presents with    Medication Evaluation     f/u visit from 8/16/18, seen for anxiety     Visit Vitals  /74 (BP 1 Location: Left arm, BP Patient Position: Sitting)   Pulse 84   Temp 98.6 °F (37 °C) (Oral)   Resp 16   Ht 5' 2.2\" (1.58 m)   Wt 163 lb 12.8 oz (74.3 kg)   LMP 09/20/2018   SpO2 100%   BMI 29.77 kg/m²     1. Have you been to the ER, urgent care clinic since your last visit? Hospitalized since your last visit? No    2. Have you seen or consulted any other health care providers outside of the [address] [place] [address] since your last visit? Include any pap smears or colon screening.  No  Health Maintenance Due   Topic Date Due    DTaP/Tdap/Td series (1 - Tdap) 04/23/2005    HPV Age 9Y-34Y (1 - Female 3-dose series) 04/23/2009    Pneumococcal 19-64 Medium Risk (1 of 1 - PPSV23) 04/23/2017    Influenza Age 9 to Adult  08/01/2018

## 2018-10-18 NOTE — PROGRESS NOTES
HPI       Chief Complaint: Anxiety/depression follow up     Broadus Ganser is a 19 y.o. female who presents for anxiety/depression. 1. Anxiety/Depression Seen 8/3/2018 after evaluation w [facility], diagnosed with anxiety/depression and ADHD. Was started on Paxil 8/3/2018 - 10mg daily. Saw psychiatrist (at [facility], doesn't remember name) a few weeks ago - recommended increasing paxil to 20mg in AM, and added clonidine 1 hr before bed. She increased the paxil to 20mg in AM but hasn't started the clonidine yet as she was told it could make her sleepy and she had work things to do. Is planning to start clonidine today or tomorrow. Has f/u appt next week, about 5 days away. Thinks the paxil is helping her think more clearly but thinks it may be worsening her ADHD. Has occasional sharp stabbing sensations in her temporal area that last a few seconds and resolve spontaneously. No HA, dizziness, N/V, abdominal pain, diarrhea, CP or SOB. PHQ-9:    1. Loss of pleasure in doing things? 1  2. Feeling down or depressed? 1  3. Trouble sleeping? 3  4. No energy or fatigue? 1  5. Poor appetite or overeating? 1  6. Feeling you have let others down or you are a failure? 1  7. Trouble concentrating? 3  8. Moving slowly or fidgety all the time? 2  9. Thinking you would be better off dead or thoughts of hurting yourself? 0   Total: 13 (moderate)   MEÑO-7:    1. Feeling nervous, anxious or on edge?  2  2. Not being able to stop or control worrying? 1  3. Worrying too much about different things? 2  4. Trouble relaxing? 1  5. Being so restless that it is hard to sit still? 2  6. Becoming easily annoyed or irritable? 2  7. Feeling afraid that something awful might happen? 2   Total: 12 (moderate)      Also sees counselor/therapist at that office. Has only seen her once, will start seeing her 1-2x/week. 2. Contraception - Menses irregular and painful before starting OCPs. Does not have ObGyn. Menses are never that heavy but has severe cramping. Tried midol which helps. Reports [facility] to [facility] and was started on OCPs, was told she could skip the placebo week. She tried this for 3 months, last month got her period and then spotted continuously for over a month. Stopped OCPs a few days ago.  (full term), delivered 2017. Doesn't want to go back on OCPs. Was bad at remembering to take them. Wants to discuss other options for contraception/dysmenorrhea     PMHx:  Past Medical History:   Diagnosis Date    Asthma      Meds:   Current Outpatient Medications   Medication Sig Dispense Refill    cloNIDine HCl (CATAPRES) 0.1 mg tablet       meloxicam (MOBIC) 7.5 mg tablet TAKE 1 TABLET BY MOUTH ONCE DAILY 30 Tab 0    PARoxetine (PAXIL) 10 mg tablet Take 1 Tab by mouth daily. 30 Tab 1    AVIANE 0.1-20 mg-mcg tab       carbamide peroxide (EAR DROPS OTC OT) by Otic route.  albuterol (PROVENTIL HFA, VENTOLIN HFA, PROAIR HFA) 90 mcg/actuation inhaler Take 2 Puffs by inhalation every four (4) hours as needed for Wheezing. 1 Inhaler 10     Allergies:   No Known Allergies    Smoker:  Social History     Tobacco Use   Smoking Status Current Some Day Smoker   Smokeless Tobacco Never Used   Tobacco Comment    ~ 10 cigarettes a week     ETOH:   Social History     Substance and Sexual Activity   Alcohol Use No     FH:   Family History   Problem Relation Age of Onset    Diabetes Maternal Grandmother        ROS  Review of Systems   Constitutional: Negative for chills, fever and weight loss. HENT: Negative for congestion, sinus pain and sore throat. Eyes: Negative for blurred vision and double vision. Respiratory: Negative for cough, shortness of breath and wheezing. Cardiovascular: Negative for chest pain and palpitations. Gastrointestinal: Negative for abdominal pain, constipation, diarrhea, nausea and vomiting. Genitourinary: Negative for dysuria, frequency and urgency. Neurological: Negative for dizziness, weakness and headaches. Physical Exam:  Visit Vitals  /74 (BP 1 Location: Left arm, BP Patient Position: Sitting)   Pulse 84   Temp 98.6 °F (37 °C) (Oral)   Resp 16   Ht 5' 2.2\" (1.58 m)   Wt 163 lb 12.8 oz (74.3 kg)   LMP 09/20/2018   SpO2 100%   BMI 29.77 kg/m²       Wt Readings from Last 3 Encounters:   10/18/18 163 lb 12.8 oz (74.3 kg)   09/27/18 165 lb (74.8 kg)   08/16/18 174 lb (78.9 kg)     BP Readings from Last 3 Encounters:   10/18/18 111/74   09/27/18 108/72   08/16/18 113/77      Physical Exam   Constitutional: She is oriented to person, place, and time. She appears well-developed and well-nourished. HENT:   Head: Normocephalic and atraumatic. Eyes: EOM are normal.   Neck: Normal range of motion. Neck supple. No thyromegaly present. Cardiovascular: Normal rate and regular rhythm. No murmur heard. Pulmonary/Chest: Effort normal and breath sounds normal. No respiratory distress. She has no wheezes. She has no rales. Abdominal: Soft. Bowel sounds are normal. She exhibits no distension. There is no tenderness. Neurological: She is alert and oriented to person, place, and time. Skin: Skin is warm and dry. Psychiatric: She has a normal mood and affect. Vitals reviewed. I personally reviewed the following lab results:   No results found for this or any previous visit (from the past 12 hour(s)). Assessment     19 y.o. female with:    ICD-10-CM ICD-9-CM    1. Depression, unspecified depression type F32.9 311    2. Encounter for immunization Z23 V03.89 INFLUENZA VIRUS VAC QUAD,SPLIT,PRESV FREE SYRINGE IM      TN IMMUNIZ ADMIN,1 SINGLE/COMB VAC/TOXOID   3. Anxiety F41.9 300.00    4.  Dysmenorrhea N94.6 625.3               Plan       Orders Placed This Encounter    TN IMMUNIZ ADMIN,1 SINGLE/COMB VAC/TOXOID    INFLUENZA VIRUS VACCINE QUADRIVALENT, PRESERVATIVE FREE SYRINGE (04221)     Anxiety/Depression - PHQ9 of 13, GAD7 of 12 (moderate depression/anxiety)  - Patient followed by psychiatrist, who recently increased paxil to 20mg daily and added clonidine daily for ADHD. Will not make any medication changes, recommended she f/u with psychiatrist  - Continue seeing therapist/counselor    Dysmenorrhea - discussed risks/benefits of IUD, nexplanon, and depo with patient. Patient to take information home and think about it, will schedule f/u appt once she has decided. Patient is aware that she will not be able to RTC and immediately have contraception placed, that there will need to be multiple visits. Patient received flu vaccine today  Patient discussed with Dr. Jhon Medeiros (Attending Physician)    I have discussed the diagnosis with the patient and the intended plan as seen in the above orders. The patient has received an after-visit summary and questions were answered concerning future plans. I have discussed medication side effects and warnings with the patient as well.     Abhilash Zhao MD  Family Medicine Resident  PGY-2

## 2019-01-08 ENCOUNTER — OFFICE VISIT (OUTPATIENT)
Dept: FAMILY MEDICINE CLINIC | Age: 19
End: 2019-01-08

## 2019-01-08 VITALS
BODY MASS INDEX: 31.47 KG/M2 | HEIGHT: 62 IN | SYSTOLIC BLOOD PRESSURE: 112 MMHG | OXYGEN SATURATION: 99 % | TEMPERATURE: 97.8 F | HEART RATE: 66 BPM | RESPIRATION RATE: 16 BRPM | WEIGHT: 171 LBS | DIASTOLIC BLOOD PRESSURE: 76 MMHG

## 2019-01-08 DIAGNOSIS — J06.9 ACUTE URI: Primary | ICD-10-CM

## 2019-01-08 DIAGNOSIS — K52.9 GASTROENTERITIS, ACUTE: ICD-10-CM

## 2019-01-08 RX ORDER — FLUTICASONE PROPIONATE 50 MCG
2 SPRAY, SUSPENSION (ML) NASAL DAILY
Qty: 1 BOTTLE | Refills: 1 | Status: SHIPPED | OUTPATIENT
Start: 2019-01-08

## 2019-01-08 NOTE — PROGRESS NOTES
[facility] is a 19 y.o. female  Chief Complaint   Patient presents with    Nasal Congestion     x4 days, denies taking any medication to treat, unsure of fever per patient    Diarrhea     x2 days    Headache     taking 800 mg prn      Visit Vitals  /76 (BP 1 Location: Left arm, BP Patient Position: Sitting)   Pulse 66   Temp 97.8 °F (36.6 °C) (Oral)   Resp 16   Ht 5' 2.2\" (1.58 m)   Wt 171 lb (77.6 kg)   LMP 12/25/2018 (Approximate)   SpO2 99%   BMI 31.08 kg/m²     1. Have you been to the ER, urgent care clinic since your last visit? Hospitalized since your last visit? No    2. Have you seen or consulted any other health care providers outside of the [address] [place] [address] since your last visit? Include any pap smears or colon screening.  No  Health Maintenance Due   Topic Date Due    DTaP/Tdap/Td series (1 - Tdap) 04/23/2005    HPV Age 9Y-34Y (1 - Female 3-dose series) 04/23/2009    Pneumococcal 19-64 Medium Risk (1 of 1 - PPSV23) 04/23/2017

## 2019-01-08 NOTE — PROGRESS NOTES
Subjective:      Lizy Rendon is a 19 y.o. female with chief complaint of diarrhea. Symptoms began 4 days ago and pt has since had about 2-3 episodes of watery diarrhea per day. The symptoms are associated with decrease appetite and cough/nasal congestion. Pt states that these symptoms began about 2 days after her URI symptoms (cough and nasal congestion) started. Pt denies any nausea, vomiting or fevers. Pt does not recall eating anything unusual.    Recent travel: no  Sick contacts: Pt reports that other family members were sick with the same symptoms. Self treatments: None  Blood in stool: no      Past Medical History:   Diagnosis Date    Asthma      Current Outpatient Medications   Medication Sig Dispense Refill    fluticasone (FLONASE) 50 mcg/actuation nasal spray 2 Sprays by Both Nostrils route daily. 1 Bottle 1    carbamide peroxide (EAR DROPS OTC OT) by Otic route.  cloNIDine HCl (CATAPRES) 0.1 mg tablet       meloxicam (MOBIC) 7.5 mg tablet TAKE 1 TABLET BY MOUTH ONCE DAILY 30 Tab 0    PARoxetine (PAXIL) 10 mg tablet Take 1 Tab by mouth daily. 30 Tab 1    AVIANE 0.1-20 mg-mcg tab       albuterol (PROVENTIL HFA, VENTOLIN HFA, PROAIR HFA) 90 mcg/actuation inhaler Take 2 Puffs by inhalation every four (4) hours as needed for Wheezing. 1 Inhaler 10     No Known Allergies    Review of Systems. A comprehensive review of systems was negative except for that written in the History of Present Illness. Objective:     Visit Vitals  /76 (BP 1 Location: Left arm, BP Patient Position: Sitting)   Pulse 66   Temp 97.8 °F (36.6 °C) (Oral)   Resp 16   Ht 5' 2.2\" (1.58 m)   Wt 171 lb (77.6 kg)   LMP 12/25/2018 (Approximate)   SpO2 99%   BMI 31.08 kg/m²       General: Alert and oriented and in no acute distress. Responds to all questions appropriately. SKIN: No obvious rash. HEENT: PERRL, no oral lesion or exudates, TM clear without effusion bilaterally. No cervical lymphadenopathy. Congested nasal mucosa. LUNGS: Respirations unlabored; clear to auscultation bilaterally. CARDIOVASCULAR: Regular, rate, and rhythm without murmurs, gallops or rubs. ABDOMEN: Soft; nondistended; normoactive bowel sounds; no masses or organomegaly. No rebound or guarding. No CVA tenderness  EXTREMITIES: No edema, well perfused, moving all extremities equally. NEUROLOGIC: Speech intact; face symmetrical.      Assessment:       ICD-10-CM ICD-9-CM    1. Acute URI J06.9 465.9 fluticasone (FLONASE) 50 mcg/actuation nasal spray   2. Gastroenteritis, acute K52.9 558.9        Plan:     1) Acute URI:  - Mucinex DM 1200mg twice daily.  - Advised to take Tylenol prn for pain or fever  - Increase fluids, salt water gargle, Throat Lozanges such as halls as needed. - Use humdifier as needed, stay in steamy bathroom tid 15 min prn severe cough  - Avoid smoky areas. - Flonase prn for congestion  -Patient was warned about pneumonia alarm symptoms and advised to call the office or come to the Emergency Room should they develop. Call office if seeing no improvement in 72 hours. 2) Diarrhea: Likely viral gastroenteritis. Symptomatic therapy includes  -Plenty of fluids: Water, Gatorade, decaffeinated defizzed soda, ice chips  -Return to clinic or go to the Emergency Room if you have increased diarrhea, vomiting, fever, pain, swelling of the abdomen, bloody stool or vomitus, or signs of dehydration: decreased urination, dry mouth, fatigue, lightheaded, (suken soft spot, no tears, dry diapers in infants) or no improvement in 48 hours    I have discussed the diagnosis with the patient and the intended plan as seen in the above orders. The patient has received an after-visit summary and questions were answered concerning future plans. I have discussed medication side effects and warnings with the patient as well.     Signed By: Mati Kinney MD     January 8, 2019

## 2019-01-08 NOTE — LETTER
NOTIFICATION RETURN TO WORK  
 
1/8/2019 3:32 PM 
 
Ms. TITUS Lora [address] [id number] To Whom It May Concern: 
 
Mary Goldberg is currently under the care of [facility]. She will return to work on: 1/9/2019 If there are questions or concerns please have the patient contact our office. Sincerely, Blaze Batista MD

## 2019-01-11 NOTE — PROGRESS NOTES
19year old female with URI sxs    I reviewed with the resident the medical history and the resident's findings on the physical examination. I discussed with the resident the patient's diagnosis and concur with the plan.

## 2021-04-08 ENCOUNTER — TRANSCRIBE ORDER (OUTPATIENT)
Dept: SCHEDULING | Age: 21
End: 2021-04-08

## 2021-04-08 DIAGNOSIS — M25.562 LEFT KNEE PAIN: Primary | ICD-10-CM

## 2021-04-08 DIAGNOSIS — M22.8X2 OTHER DISORDERS OF PATELLA, LEFT KNEE: ICD-10-CM

## 2021-11-24 ENCOUNTER — OFFICE VISIT (OUTPATIENT)
Dept: SURGERY | Age: 21
End: 2021-11-24
Payer: COMMERCIAL

## 2021-11-24 VITALS
HEIGHT: 62 IN | DIASTOLIC BLOOD PRESSURE: 79 MMHG | BODY MASS INDEX: 35.51 KG/M2 | WEIGHT: 193 LBS | HEART RATE: 74 BPM | SYSTOLIC BLOOD PRESSURE: 117 MMHG

## 2021-11-24 DIAGNOSIS — N63.10 BREAST MASS, RIGHT: ICD-10-CM

## 2021-11-24 DIAGNOSIS — N63.20 BREAST MASS, LEFT: Primary | ICD-10-CM

## 2021-11-24 PROCEDURE — 99202 OFFICE O/P NEW SF 15 MIN: CPT | Performed by: SURGERY

## 2021-11-24 PROCEDURE — 76642 ULTRASOUND BREAST LIMITED: CPT | Performed by: SURGERY

## 2021-11-24 NOTE — PATIENT INSTRUCTIONS
Breast Lumps: Care Instructions  Your Care Instructions  Breast lumps are common, especially in women between ages 27 and 48. Many women's breasts feel lumpy and tender before their menstrual period. Women also may have lumps when they are breastfeeding. Breast lumps may go away after menopause. All new breast lumps in women after menopause should be checked by a doctor. Although lumps may be normal for you, it is important to have your doctor check any lump or thickness that is not like the rest of your breast to make sure it is not cancer. A lump may be larger, harder, or different from the rest of your breast tissue. Follow-up care is a key part of your treatment and safety. Be sure to make and go to all appointments, and call your doctor if you are having problems. It's also a good idea to know your test results and keep a list of the medicines you take. How can you care for yourself at home? · Make an appointment to have a mammogram and other follow-up visits as recommended by your doctor. When should you call for help? Watch closely for changes in your health, and be sure to contact your doctor if:    · You do not get better as expected.     · Your breast has changed.     · You have pain in your breast.     · You have a discharge from your nipple.     · A breast lump changes or does not go away. Where can you learn more? Go to http://www.gray.com/  Enter Q928 in the search box to learn more about \"Breast Lumps: Care Instructions. \"  Current as of: February 11, 2021               Content Version: 13.0  © 2006-2021 [employer]. Care instructions adapted under license by [employer] (which disclaims liability or warranty for this information). If you have questions about a medical condition or this instruction, always ask your healthcare professional. [address] any warranty or liability for your use of this information.

## 2021-11-24 NOTE — PROGRESS NOTES
HISTORY OF PRESENT ILLNESS  Salas Abel is a 19 y.o. female. HPI  NEW patient consult for BILATERAL breast mass under the breast, purple/bilateral burning and itching. She found the RIGHT breast mass 1 months ago and the LEFT breast mass last week. Family History:  Maternal grandmother had breast cancer in her 19's, bilateral mastectomies, survivor  3 great aunts (maternal) had breast cancer, survivors  Vero Tacho great grandmother, breast cancer, survivor  Mother does not have breast cancer  No imaging   Past Medical History:   Diagnosis Date    Asthma      No past surgical history on file. OB History    No obstetric history on file. Obstetric Comments   Menarche 13, LMP 11/12/21, # of children 1, age of 4st delivery 21, Hysterectomy/oophorectomy no/no, Breast bx no, history of breast feeding yes, BCP no, Hormone therapy no           Family History   Problem Relation Age of Onset    Diabetes Maternal Grandmother      Social History     Tobacco Use    Smoking status: Current Some Day Smoker    Smokeless tobacco: Never Used    Tobacco comment: ~ 10 cigarettes a week   Substance Use Topics    Alcohol use: No      Prior to Admission medications    Medication Sig Start Date End Date Taking? Authorizing Provider   albuterol (PROVENTIL HFA, VENTOLIN HFA, PROAIR HFA) 90 mcg/actuation inhaler Take 2 Puffs by inhalation every four (4) hours as needed for Wheezing. 5/22/18  Yes Jarrett Glasgow MD   fluticasone (FLONASE) 50 mcg/actuation nasal spray 2 Sprays by Both Nostrils route daily. Patient not taking: Reported on 11/24/2021 1/8/19   Drea Thomas MD   cloNIDine HCl (CATAPRES) 0.1 mg tablet  10/16/18   Provider, Historical   meloxicam (MOBIC) 7.5 mg tablet TAKE 1 TABLET BY MOUTH ONCE DAILY  Patient not taking: Reported on 11/24/2021 8/8/18   Toya Cruz MD   PARoxetine (PAXIL) 10 mg tablet Take 1 Tab by mouth daily.  8/3/18   Gable Closs, MD   AVIANE 0.1-20 mg-mcg tab  6/18/18 Provider, Historical   carbamide peroxide (EAR DROPS OTC OT) by Otic route. Patient not taking: Reported on 11/24/2021    Provider, Historical      No Known Allergies    Review of Systems   Cardiovascular: Positive for chest pain. Skin: Positive for itching and rash. Psychiatric/Behavioral: Positive for depression and memory loss. The patient is nervous/anxious and has insomnia. All other systems reviewed and are negative. Physical Exam  Chest:   Breasts: Breasts are symmetrical.      Right: Mass (1 cm oval mobile mass LOQ) present. No inverted nipple, nipple discharge, skin change, tenderness, axillary adenopathy or supraclavicular adenopathy. Left: Mass (1cm oval mass LOQ. overlying 6mm scar) present. No inverted nipple, nipple discharge, skin change, tenderness, axillary adenopathy or supraclavicular adenopathy. Comments: Nodular tissue lower breasts  Lymphadenopathy:      Upper Body:      Right upper body: No supraclavicular or axillary adenopathy. Left upper body: No supraclavicular or axillary adenopathy. BILATERAL BREAST ULTRASOUND  Indication: BILATERAL breast  masses  Technique:  Bilateral 4 quadrant ultrasound was performed using a high-frequency linear-array near-field transducer  Findings: RIGHT 7:00 3/3 9mm oval hypoechoic mass corresponds with palpable mass. LEFT 7:00 3/3 8mm oval hypoechoic mass just under the skin, corresponds with palpable mass. Impression: RIGHT lipoma. LEFT sebaceous cyst  Disposition: No worrisome finding on ultrasound    ASSESSMENT and PLAN    ICD-10-CM ICD-9-CM    1. Breast mass, left  N63.20 611.72    2. Breast mass, right  N63.10 611.72      Total time spent with patient: 20 minutes     Bilateral breast masses. RIGHT lipoma.   LEFT sebacous cyst.  Pt said the cyst had drained in the past.  No erythema or infection at this time  Breast pain is likely musculoskeletal.    Discussed self breast exam and explained what a noncancerous mass feels like (smooth, round and mobile) compared with a cancerous mass (hard, fixed, bumpy and often associated with a skin dimple). I gave her a BRCA pamphlet to share with her mother. Mother lives in [place]. She would be a candidate for BRCA testing.

## 2021-11-24 NOTE — LETTER
11/24/2021    Patient: Andres Fairchild   YOB: 2000   Date of Visit: 11/24/2021     Eitan Dhaliwal, [address] [place] [address]    Dear Eitan Dhaliwal MD,      Thank you for referring Ms. Marely Fraga to [address] [place] [address] for evaluation. My notes for this consultation are attached. If you have questions, please do not hesitate to call me. I look forward to following your patient along with you.       Sincerely,    Patienec Sweet MD

## 2022-03-18 PROBLEM — F41.9 ANXIETY: Status: ACTIVE | Noted: 2018-10-18

## 2022-03-19 PROBLEM — F17.200 SMOKER: Status: ACTIVE | Noted: 2018-07-07

## 2022-03-19 PROBLEM — M76.51 PATELLAR TENDONITIS OF BOTH KNEES: Status: ACTIVE | Noted: 2018-07-07

## 2022-03-19 PROBLEM — M76.52 PATELLAR TENDONITIS OF BOTH KNEES: Status: ACTIVE | Noted: 2018-07-07

## 2022-03-19 PROBLEM — F32.A DEPRESSION: Status: ACTIVE | Noted: 2018-10-18

## 2022-04-26 ENCOUNTER — NURSE TRIAGE (OUTPATIENT)
Dept: OTHER | Facility: CLINIC | Age: 22
End: 2022-04-26

## 2022-04-26 NOTE — TELEPHONE ENCOUNTER
Received call from Irma Terrazas  at [facility] with [employer]. Subjective: Caller states \"I am passing some blood clots in my stools. For the past few weeks I have been getting some random bruises on my body. \"     Current Symptoms:   +just started today- toilet water turned red x 1 occurrence - blood was separate from stool   +2-3 clots the size of a nickel   -NOT on blood thinners   +feels weak - feels heavy - no appetite x 2 days   +\"more constant need to go to the bathroom than normal \"  -no rectal or abdominal pain       Onset: 1 day ago; N/A     Associated Symptoms: reduced appetite    Pain Severity: Denies     Temperature: Denies     What has been tried: no medications or interventions     LMP: just ended yesterday  Pregnant: No    Recommended disposition: See PCP within 24 Hours    Care advice provided, patient verbalizes understanding; denies any other questions or concerns; instructed to call back for any new or worsening symptoms. Patient/Caller agrees with recommended disposition; writer provided warm transfer to [employer]  at [facility] for appointment scheduling    Attention Provider: Thank you for allowing me to participate in the care of your patient. The patient was connected to triage in response to information provided to the [facility]. Please do not respond through this encounter as the response is not directed to a shared pool.       Reason for Disposition   MODERATE rectal bleeding (small blood clots, passing blood without stool, or toilet water turns red)    Protocols used: RECTAL BLEEDING-ADULT-AH

## 2022-12-13 ENCOUNTER — NURSE TRIAGE (OUTPATIENT)
Dept: OTHER | Facility: CLINIC | Age: 22
End: 2022-12-13

## 2022-12-13 NOTE — TELEPHONE ENCOUNTER
Location of patient: [address] chao from Roxanna Zaidi at [facility] with [employer]. Subjective: Caller states \"It started, I had an infection on the roof of my mouth, I woke up this morning with SOB, and crusty eyes\"     Current Symptoms: + exposure to pinkeye; headache, 5/10; body aches, 6/10; sinus pressure, 8/10 and congestion, sore throat, 3/10; productive cough, painful to cough, mild chest pressure - when coughing/ talking; diarrhea x 4 (past 5 hrs); sweats at night; roof of mouth infection (raw, sensitive, white)    Pt denies any CP, difficulty breathing, numbness, N/V, or vision issues. Onset: 1 day ago; gradual    Associated Symptoms: reduced activity, reduced appetite, diarrhea    Pain Severity: see current symptoms above    Temperature: 100.0 by forehead thermometer    What has been tried: dayquil    LMP:  11/15/2022  Pregnant: No    Recommended disposition: Go to [facility]    Care advice provided, patient verbalizes understanding; denies any other questions or concerns; instructed to call back for any new or worsening symptoms. Patient/Caller agrees with recommended disposition; writer provided warm transfer to [facility] at [facility] for appointment scheduling    Attention Provider: Thank you for allowing me to participate in the care of your patient. The patient was connected to triage in response to information provided to the [facility]. Please do not respond through this encounter as the response is not directed to a shared pool.     Reason for Disposition   SEVERE sinus pain    Protocols used: Sinus Pain or Congestion-ADULT-[place]

## 2022-12-13 NOTE — TELEPHONE ENCOUNTER
Spoke with patient. Advised her she is considered a new patient since she has not been seen since 2019. I explained to her that I do not have any new patient appointments available soon and she should go to an urgent care facility. Patient states understanding.

## 2023-05-20 RX ORDER — LEVONORGESTREL AND ETHINYL ESTRADIOL 0.1-0.02MG
KIT ORAL
COMMUNITY
Start: 2018-06-18

## 2023-05-20 RX ORDER — MELOXICAM 7.5 MG/1
1 TABLET ORAL DAILY
COMMUNITY
Start: 2018-08-08

## 2023-05-20 RX ORDER — FLUTICASONE PROPIONATE 50 MCG
2 SPRAY, SUSPENSION (ML) NASAL DAILY
COMMUNITY
Start: 2019-01-08

## 2023-05-20 RX ORDER — PAROXETINE 10 MG/1
10 TABLET, FILM COATED ORAL DAILY
COMMUNITY
Start: 2018-08-03

## 2023-05-20 RX ORDER — CLONIDINE HYDROCHLORIDE 0.1 MG/1
TABLET ORAL
COMMUNITY
Start: 2018-10-16

## 2023-05-20 RX ORDER — ALBUTEROL SULFATE 90 UG/1
2 AEROSOL, METERED RESPIRATORY (INHALATION) EVERY 4 HOURS PRN
COMMUNITY
Start: 2018-05-22